# Patient Record
Sex: FEMALE | Race: WHITE | Employment: FULL TIME | URBAN - METROPOLITAN AREA
[De-identification: names, ages, dates, MRNs, and addresses within clinical notes are randomized per-mention and may not be internally consistent; named-entity substitution may affect disease eponyms.]

---

## 2018-02-09 LAB
HBV SURFACE AG SERPL QL IA: NEGATIVE
HIV 1+2 AB+HIV1 P24 AG SERPL QL IA: NORMAL
RUBELLA ABY IGG: NORMAL

## 2018-06-19 ENCOUNTER — HOSPITAL ENCOUNTER (OUTPATIENT)
Facility: CLINIC | Age: 33
End: 2018-06-19

## 2018-06-27 RX ORDER — CITRIC ACID/SODIUM CITRATE 334-500MG
30 SOLUTION, ORAL ORAL
Status: CANCELLED | OUTPATIENT
Start: 2018-06-27

## 2018-06-27 RX ORDER — CEFAZOLIN SODIUM 1 G/3ML
1 INJECTION, POWDER, FOR SOLUTION INTRAMUSCULAR; INTRAVENOUS SEE ADMIN INSTRUCTIONS
Status: CANCELLED | OUTPATIENT
Start: 2018-06-27

## 2018-06-27 RX ORDER — CEFAZOLIN SODIUM 2 G/100ML
2 INJECTION, SOLUTION INTRAVENOUS
Status: CANCELLED | OUTPATIENT
Start: 2018-06-27

## 2018-06-27 RX ORDER — SODIUM CHLORIDE, SODIUM LACTATE, POTASSIUM CHLORIDE, CALCIUM CHLORIDE 600; 310; 30; 20 MG/100ML; MG/100ML; MG/100ML; MG/100ML
INJECTION, SOLUTION INTRAVENOUS CONTINUOUS
Status: CANCELLED | OUTPATIENT
Start: 2018-06-27

## 2018-06-28 LAB — GROUP B STREP PCR: NEGATIVE

## 2018-07-02 ENCOUNTER — ANESTHESIA EVENT (OUTPATIENT)
Dept: OBGYN | Facility: CLINIC | Age: 33
End: 2018-07-02
Payer: COMMERCIAL

## 2018-07-02 ENCOUNTER — HOSPITAL ENCOUNTER (INPATIENT)
Facility: CLINIC | Age: 33
LOS: 3 days | Discharge: HOME HEALTH | End: 2018-07-05
Attending: OBSTETRICS & GYNECOLOGY | Admitting: OBSTETRICS & GYNECOLOGY
Payer: COMMERCIAL

## 2018-07-02 ENCOUNTER — ANESTHESIA (OUTPATIENT)
Dept: OBGYN | Facility: CLINIC | Age: 33
End: 2018-07-02
Payer: COMMERCIAL

## 2018-07-02 LAB
ABO + RH BLD: NORMAL
ABO + RH BLD: NORMAL
BLD GP AB SCN SERPL QL: NORMAL
BLOOD BANK CMNT PATIENT-IMP: NORMAL
HGB BLD-MCNC: 11.5 G/DL (ref 11.7–15.7)
SPECIMEN EXP DATE BLD: NORMAL
T PALLIDUM AB SER QL: NONREACTIVE

## 2018-07-02 PROCEDURE — 85018 HEMOGLOBIN: CPT | Performed by: OBSTETRICS & GYNECOLOGY

## 2018-07-02 PROCEDURE — 86850 RBC ANTIBODY SCREEN: CPT | Performed by: OBSTETRICS & GYNECOLOGY

## 2018-07-02 PROCEDURE — 36000056 ZZH SURGERY LEVEL 3 1ST 30 MIN: Performed by: OBSTETRICS & GYNECOLOGY

## 2018-07-02 PROCEDURE — 25000128 H RX IP 250 OP 636: Performed by: OBSTETRICS & GYNECOLOGY

## 2018-07-02 PROCEDURE — 37000009 ZZH ANESTHESIA TECHNICAL FEE, EACH ADDTL 15 MIN: Performed by: OBSTETRICS & GYNECOLOGY

## 2018-07-02 PROCEDURE — 25000125 ZZHC RX 250: Performed by: OBSTETRICS & GYNECOLOGY

## 2018-07-02 PROCEDURE — 25000128 H RX IP 250 OP 636: Performed by: ANESTHESIOLOGY

## 2018-07-02 PROCEDURE — 27210794 ZZH OR GENERAL SUPPLY STERILE: Performed by: OBSTETRICS & GYNECOLOGY

## 2018-07-02 PROCEDURE — 36000058 ZZH SURGERY LEVEL 3 EA 15 ADDTL MIN: Performed by: OBSTETRICS & GYNECOLOGY

## 2018-07-02 PROCEDURE — G0463 HOSPITAL OUTPT CLINIC VISIT: HCPCS

## 2018-07-02 PROCEDURE — 86901 BLOOD TYPING SEROLOGIC RH(D): CPT | Performed by: OBSTETRICS & GYNECOLOGY

## 2018-07-02 PROCEDURE — 71000012 ZZH RECOVERY PHASE 1 LEVEL 1 FIRST HR: Performed by: OBSTETRICS & GYNECOLOGY

## 2018-07-02 PROCEDURE — 86900 BLOOD TYPING SEROLOGIC ABO: CPT | Performed by: OBSTETRICS & GYNECOLOGY

## 2018-07-02 PROCEDURE — 37000008 ZZH ANESTHESIA TECHNICAL FEE, 1ST 30 MIN: Performed by: OBSTETRICS & GYNECOLOGY

## 2018-07-02 PROCEDURE — 25000132 ZZH RX MED GY IP 250 OP 250 PS 637: Performed by: OBSTETRICS & GYNECOLOGY

## 2018-07-02 PROCEDURE — 12000029 ZZH R&B OB INTERMEDIATE

## 2018-07-02 PROCEDURE — 86780 TREPONEMA PALLIDUM: CPT | Performed by: OBSTETRICS & GYNECOLOGY

## 2018-07-02 PROCEDURE — 27210995 ZZH RX 272: Performed by: OBSTETRICS & GYNECOLOGY

## 2018-07-02 RX ORDER — ONDANSETRON 2 MG/ML
4 INJECTION INTRAMUSCULAR; INTRAVENOUS EVERY 6 HOURS PRN
Status: DISCONTINUED | OUTPATIENT
Start: 2018-07-02 | End: 2018-07-05 | Stop reason: HOSPADM

## 2018-07-02 RX ORDER — METOCLOPRAMIDE HYDROCHLORIDE 5 MG/ML
10 INJECTION INTRAMUSCULAR; INTRAVENOUS EVERY 6 HOURS PRN
Status: DISCONTINUED | OUTPATIENT
Start: 2018-07-02 | End: 2018-07-05 | Stop reason: HOSPADM

## 2018-07-02 RX ORDER — NALOXONE HYDROCHLORIDE 0.4 MG/ML
.1-.4 INJECTION, SOLUTION INTRAMUSCULAR; INTRAVENOUS; SUBCUTANEOUS
Status: DISCONTINUED | OUTPATIENT
Start: 2018-07-02 | End: 2018-07-05 | Stop reason: HOSPADM

## 2018-07-02 RX ORDER — LIDOCAINE 40 MG/G
CREAM TOPICAL
Status: DISCONTINUED | OUTPATIENT
Start: 2018-07-02 | End: 2018-07-05 | Stop reason: HOSPADM

## 2018-07-02 RX ORDER — SODIUM CHLORIDE, SODIUM LACTATE, POTASSIUM CHLORIDE, CALCIUM CHLORIDE 600; 310; 30; 20 MG/100ML; MG/100ML; MG/100ML; MG/100ML
INJECTION, SOLUTION INTRAVENOUS CONTINUOUS
Status: DISCONTINUED | OUTPATIENT
Start: 2018-07-02 | End: 2018-07-02 | Stop reason: HOSPADM

## 2018-07-02 RX ORDER — IBUPROFEN 800 MG/1
800 TABLET, FILM COATED ORAL EVERY 6 HOURS PRN
Status: DISCONTINUED | OUTPATIENT
Start: 2018-07-02 | End: 2018-07-05 | Stop reason: HOSPADM

## 2018-07-02 RX ORDER — KETOROLAC TROMETHAMINE 30 MG/ML
30 INJECTION, SOLUTION INTRAMUSCULAR; INTRAVENOUS EVERY 6 HOURS
Status: COMPLETED | OUTPATIENT
Start: 2018-07-02 | End: 2018-07-03

## 2018-07-02 RX ORDER — SIMETHICONE 80 MG
80 TABLET,CHEWABLE ORAL 4 TIMES DAILY PRN
Status: DISCONTINUED | OUTPATIENT
Start: 2018-07-02 | End: 2018-07-05 | Stop reason: HOSPADM

## 2018-07-02 RX ORDER — ACETAMINOPHEN 325 MG/1
975 TABLET ORAL EVERY 8 HOURS
Status: DISPENSED | OUTPATIENT
Start: 2018-07-02 | End: 2018-07-05

## 2018-07-02 RX ORDER — FENTANYL CITRATE 50 UG/ML
25-50 INJECTION, SOLUTION INTRAMUSCULAR; INTRAVENOUS
Status: DISCONTINUED | OUTPATIENT
Start: 2018-07-02 | End: 2018-07-02 | Stop reason: HOSPADM

## 2018-07-02 RX ORDER — NALBUPHINE HYDROCHLORIDE 10 MG/ML
2.5-5 INJECTION, SOLUTION INTRAMUSCULAR; INTRAVENOUS; SUBCUTANEOUS EVERY 6 HOURS PRN
Status: DISCONTINUED | OUTPATIENT
Start: 2018-07-02 | End: 2018-07-02 | Stop reason: HOSPADM

## 2018-07-02 RX ORDER — AMOXICILLIN 250 MG
1 CAPSULE ORAL 2 TIMES DAILY PRN
Status: DISCONTINUED | OUTPATIENT
Start: 2018-07-02 | End: 2018-07-05 | Stop reason: HOSPADM

## 2018-07-02 RX ORDER — ONDANSETRON 2 MG/ML
4 INJECTION INTRAMUSCULAR; INTRAVENOUS EVERY 30 MIN PRN
Status: DISCONTINUED | OUTPATIENT
Start: 2018-07-02 | End: 2018-07-02 | Stop reason: HOSPADM

## 2018-07-02 RX ORDER — NALOXONE HYDROCHLORIDE 0.4 MG/ML
.1-.4 INJECTION, SOLUTION INTRAMUSCULAR; INTRAVENOUS; SUBCUTANEOUS
Status: DISCONTINUED | OUTPATIENT
Start: 2018-07-02 | End: 2018-07-02 | Stop reason: HOSPADM

## 2018-07-02 RX ORDER — EPHEDRINE SULFATE 50 MG/ML
5 INJECTION, SOLUTION INTRAMUSCULAR; INTRAVENOUS; SUBCUTANEOUS
Status: DISCONTINUED | OUTPATIENT
Start: 2018-07-02 | End: 2018-07-02 | Stop reason: HOSPADM

## 2018-07-02 RX ORDER — MISOPROSTOL 200 UG/1
800 TABLET ORAL
Status: DISCONTINUED | OUTPATIENT
Start: 2018-07-02 | End: 2018-07-05 | Stop reason: HOSPADM

## 2018-07-02 RX ORDER — OXYTOCIN 10 [USP'U]/ML
10 INJECTION, SOLUTION INTRAMUSCULAR; INTRAVENOUS
Status: DISCONTINUED | OUTPATIENT
Start: 2018-07-02 | End: 2018-07-05 | Stop reason: HOSPADM

## 2018-07-02 RX ORDER — METHYLERGONOVINE MALEATE 0.2 MG/ML
200 INJECTION INTRAVENOUS
Status: DISCONTINUED | OUTPATIENT
Start: 2018-07-02 | End: 2018-07-05 | Stop reason: HOSPADM

## 2018-07-02 RX ORDER — BISACODYL 10 MG
10 SUPPOSITORY, RECTAL RECTAL DAILY PRN
Status: DISCONTINUED | OUTPATIENT
Start: 2018-07-04 | End: 2018-07-05 | Stop reason: HOSPADM

## 2018-07-02 RX ORDER — HYDROCORTISONE 2.5 %
CREAM (GRAM) TOPICAL 3 TIMES DAILY PRN
Status: DISCONTINUED | OUTPATIENT
Start: 2018-07-02 | End: 2018-07-05 | Stop reason: HOSPADM

## 2018-07-02 RX ORDER — AMOXICILLIN 250 MG
2 CAPSULE ORAL 2 TIMES DAILY PRN
Status: DISCONTINUED | OUTPATIENT
Start: 2018-07-02 | End: 2018-07-05 | Stop reason: HOSPADM

## 2018-07-02 RX ORDER — ONDANSETRON 2 MG/ML
INJECTION INTRAMUSCULAR; INTRAVENOUS PRN
Status: DISCONTINUED | OUTPATIENT
Start: 2018-07-02 | End: 2018-07-02

## 2018-07-02 RX ORDER — HYDROMORPHONE HYDROCHLORIDE 1 MG/ML
.3-.5 INJECTION, SOLUTION INTRAMUSCULAR; INTRAVENOUS; SUBCUTANEOUS EVERY 10 MIN PRN
Status: DISCONTINUED | OUTPATIENT
Start: 2018-07-02 | End: 2018-07-02 | Stop reason: HOSPADM

## 2018-07-02 RX ORDER — MEPERIDINE HYDROCHLORIDE 50 MG/ML
12.5 INJECTION INTRAMUSCULAR; INTRAVENOUS; SUBCUTANEOUS
Status: DISCONTINUED | OUTPATIENT
Start: 2018-07-02 | End: 2018-07-02 | Stop reason: HOSPADM

## 2018-07-02 RX ORDER — LANOLIN 100 %
OINTMENT (GRAM) TOPICAL
Status: DISCONTINUED | OUTPATIENT
Start: 2018-07-02 | End: 2018-07-05 | Stop reason: HOSPADM

## 2018-07-02 RX ORDER — LIDOCAINE 40 MG/G
CREAM TOPICAL
Status: DISCONTINUED | OUTPATIENT
Start: 2018-07-02 | End: 2018-07-02 | Stop reason: HOSPADM

## 2018-07-02 RX ORDER — MAGNESIUM HYDROXIDE 1200 MG/15ML
LIQUID ORAL PRN
Status: DISCONTINUED | OUTPATIENT
Start: 2018-07-02 | End: 2018-07-05 | Stop reason: HOSPADM

## 2018-07-02 RX ORDER — OXYTOCIN/0.9 % SODIUM CHLORIDE 30/500 ML
340 PLASTIC BAG, INJECTION (ML) INTRAVENOUS CONTINUOUS PRN
Status: DISCONTINUED | OUTPATIENT
Start: 2018-07-02 | End: 2018-07-05 | Stop reason: HOSPADM

## 2018-07-02 RX ORDER — OXYTOCIN/0.9 % SODIUM CHLORIDE 30/500 ML
PLASTIC BAG, INJECTION (ML) INTRAVENOUS PRN
Status: DISCONTINUED | OUTPATIENT
Start: 2018-07-02 | End: 2018-07-02

## 2018-07-02 RX ORDER — DIPHENHYDRAMINE HCL 25 MG
25 CAPSULE ORAL EVERY 6 HOURS PRN
Status: DISCONTINUED | OUTPATIENT
Start: 2018-07-02 | End: 2018-07-05 | Stop reason: HOSPADM

## 2018-07-02 RX ORDER — DEXTROSE, SODIUM CHLORIDE, SODIUM LACTATE, POTASSIUM CHLORIDE, AND CALCIUM CHLORIDE 5; .6; .31; .03; .02 G/100ML; G/100ML; G/100ML; G/100ML; G/100ML
INJECTION, SOLUTION INTRAVENOUS CONTINUOUS
Status: DISCONTINUED | OUTPATIENT
Start: 2018-07-02 | End: 2018-07-05 | Stop reason: HOSPADM

## 2018-07-02 RX ORDER — ALBUTEROL SULFATE 0.83 MG/ML
2.5 SOLUTION RESPIRATORY (INHALATION) EVERY 4 HOURS PRN
Status: DISCONTINUED | OUTPATIENT
Start: 2018-07-02 | End: 2018-07-02 | Stop reason: HOSPADM

## 2018-07-02 RX ORDER — IBUPROFEN 600 MG/1
600 TABLET, FILM COATED ORAL EVERY 6 HOURS PRN
Status: DISCONTINUED | OUTPATIENT
Start: 2018-07-02 | End: 2018-07-05 | Stop reason: HOSPADM

## 2018-07-02 RX ORDER — IBUPROFEN 400 MG/1
400 TABLET, FILM COATED ORAL EVERY 6 HOURS PRN
Status: DISCONTINUED | OUTPATIENT
Start: 2018-07-02 | End: 2018-07-05 | Stop reason: HOSPADM

## 2018-07-02 RX ORDER — CEFAZOLIN SODIUM 1 G/3ML
1 INJECTION, POWDER, FOR SOLUTION INTRAMUSCULAR; INTRAVENOUS SEE ADMIN INSTRUCTIONS
Status: DISCONTINUED | OUTPATIENT
Start: 2018-07-02 | End: 2018-07-02 | Stop reason: HOSPADM

## 2018-07-02 RX ORDER — DIPHENHYDRAMINE HYDROCHLORIDE 50 MG/ML
25 INJECTION INTRAMUSCULAR; INTRAVENOUS EVERY 6 HOURS PRN
Status: DISCONTINUED | OUTPATIENT
Start: 2018-07-02 | End: 2018-07-05 | Stop reason: HOSPADM

## 2018-07-02 RX ORDER — EPHEDRINE SULFATE 50 MG/ML
INJECTION, SOLUTION INTRAVENOUS PRN
Status: DISCONTINUED | OUTPATIENT
Start: 2018-07-02 | End: 2018-07-02

## 2018-07-02 RX ORDER — HYDROMORPHONE HYDROCHLORIDE 1 MG/ML
.3-.5 INJECTION, SOLUTION INTRAMUSCULAR; INTRAVENOUS; SUBCUTANEOUS EVERY 30 MIN PRN
Status: DISCONTINUED | OUTPATIENT
Start: 2018-07-02 | End: 2018-07-05 | Stop reason: HOSPADM

## 2018-07-02 RX ORDER — CITRIC ACID/SODIUM CITRATE 334-500MG
30 SOLUTION, ORAL ORAL
Status: COMPLETED | OUTPATIENT
Start: 2018-07-02 | End: 2018-07-02

## 2018-07-02 RX ORDER — SCOLOPAMINE TRANSDERMAL SYSTEM 1 MG/1
1 PATCH, EXTENDED RELEASE TRANSDERMAL ONCE
Status: DISCONTINUED | OUTPATIENT
Start: 2018-07-02 | End: 2018-07-02 | Stop reason: HOSPADM

## 2018-07-02 RX ORDER — CEFAZOLIN SODIUM 2 G/100ML
2 INJECTION, SOLUTION INTRAVENOUS
Status: COMPLETED | OUTPATIENT
Start: 2018-07-02 | End: 2018-07-02

## 2018-07-02 RX ORDER — OXYTOCIN/0.9 % SODIUM CHLORIDE 30/500 ML
100 PLASTIC BAG, INJECTION (ML) INTRAVENOUS CONTINUOUS
Status: DISCONTINUED | OUTPATIENT
Start: 2018-07-02 | End: 2018-07-05 | Stop reason: HOSPADM

## 2018-07-02 RX ORDER — ONDANSETRON 4 MG/1
4 TABLET, ORALLY DISINTEGRATING ORAL EVERY 30 MIN PRN
Status: DISCONTINUED | OUTPATIENT
Start: 2018-07-02 | End: 2018-07-02 | Stop reason: HOSPADM

## 2018-07-02 RX ORDER — OXYCODONE HYDROCHLORIDE 5 MG/1
5-10 TABLET ORAL
Status: DISCONTINUED | OUTPATIENT
Start: 2018-07-02 | End: 2018-07-05 | Stop reason: HOSPADM

## 2018-07-02 RX ORDER — ACETAMINOPHEN 325 MG/1
650 TABLET ORAL EVERY 4 HOURS PRN
Status: DISCONTINUED | OUTPATIENT
Start: 2018-07-05 | End: 2018-07-05 | Stop reason: HOSPADM

## 2018-07-02 RX ADMIN — SODIUM CHLORIDE, SODIUM LACTATE, POTASSIUM CHLORIDE, CALCIUM CHLORIDE AND DEXTROSE MONOHYDRATE: 5; 600; 310; 30; 20 INJECTION, SOLUTION INTRAVENOUS at 09:25

## 2018-07-02 RX ADMIN — CEFAZOLIN SODIUM 2 G: 2 INJECTION, SOLUTION INTRAVENOUS at 01:31

## 2018-07-02 RX ADMIN — Medication 1 ML: at 01:58

## 2018-07-02 RX ADMIN — SODIUM CHLORIDE, POTASSIUM CHLORIDE, SODIUM LACTATE AND CALCIUM CHLORIDE: 600; 310; 30; 20 INJECTION, SOLUTION INTRAVENOUS at 01:51

## 2018-07-02 RX ADMIN — OXYTOCIN-SODIUM CHLORIDE 0.9% IV SOLN 30 UNIT/500ML 100 ML/HR: 30-0.9/5 SOLUTION at 03:55

## 2018-07-02 RX ADMIN — SODIUM CHLORIDE, POTASSIUM CHLORIDE, SODIUM LACTATE AND CALCIUM CHLORIDE: 600; 310; 30; 20 INJECTION, SOLUTION INTRAVENOUS at 01:31

## 2018-07-02 RX ADMIN — SODIUM CITRATE AND CITRIC ACID MONOHYDRATE 30 ML: 500; 334 SOLUTION ORAL at 01:22

## 2018-07-02 RX ADMIN — ONDANSETRON 4 MG: 2 SOLUTION INTRAMUSCULAR; INTRAVENOUS at 02:40

## 2018-07-02 RX ADMIN — EPHEDRINE SULFATE 5 MG: 50 INJECTION, SOLUTION INTRAVENOUS at 01:41

## 2018-07-02 RX ADMIN — KETOROLAC TROMETHAMINE 30 MG: 30 INJECTION, SOLUTION INTRAMUSCULAR at 12:22

## 2018-07-02 RX ADMIN — SODIUM CHLORIDE, POTASSIUM CHLORIDE, SODIUM LACTATE AND CALCIUM CHLORIDE 500 ML: 600; 310; 30; 20 INJECTION, SOLUTION INTRAVENOUS at 13:00

## 2018-07-02 RX ADMIN — Medication 199 ML: at 02:16

## 2018-07-02 RX ADMIN — SODIUM CHLORIDE, POTASSIUM CHLORIDE, SODIUM LACTATE AND CALCIUM CHLORIDE 1000 ML: 600; 310; 30; 20 INJECTION, SOLUTION INTRAVENOUS at 01:10

## 2018-07-02 RX ADMIN — ONDANSETRON 4 MG: 2 INJECTION INTRAMUSCULAR; INTRAVENOUS at 02:01

## 2018-07-02 RX ADMIN — KETOROLAC TROMETHAMINE 30 MG: 30 INJECTION, SOLUTION INTRAMUSCULAR at 18:34

## 2018-07-02 RX ADMIN — SODIUM CHLORIDE, SODIUM LACTATE, POTASSIUM CHLORIDE, CALCIUM CHLORIDE AND DEXTROSE MONOHYDRATE: 5; 600; 310; 30; 20 INJECTION, SOLUTION INTRAVENOUS at 11:59

## 2018-07-02 RX ADMIN — KETOROLAC TROMETHAMINE 30 MG: 30 INJECTION, SOLUTION INTRAMUSCULAR at 03:55

## 2018-07-02 RX ADMIN — SENNOSIDES AND DOCUSATE SODIUM 1 TABLET: 8.6; 5 TABLET ORAL at 21:35

## 2018-07-02 RX ADMIN — ACETAMINOPHEN 975 MG: 325 TABLET, FILM COATED ORAL at 12:22

## 2018-07-02 RX ADMIN — METOCLOPRAMIDE 10 MG: 5 INJECTION, SOLUTION INTRAMUSCULAR; INTRAVENOUS at 08:43

## 2018-07-02 NOTE — ANESTHESIA PREPROCEDURE EVALUATION
PAC NOTE:       ANESTHESIA PRE EVALUATION:  Anesthesia Evaluation     .             ROS/MED HX    ENT/Pulmonary:  - neg pulmonary ROS     Neurologic:  - neg neurologic ROS     Cardiovascular:  - neg cardiovascular ROS       METS/Exercise Tolerance:     Hematologic:  - neg hematologic  ROS       Musculoskeletal:  - neg musculoskeletal ROS       GI/Hepatic:  - neg GI/hepatic ROS       Renal/Genitourinary:  - ROS Renal section negative       Endo:     (+) type II DM .      Psychiatric:  - neg psychiatric ROS       Infectious Disease:  - neg infectious disease ROS       Malignancy:         Other: Comment: .Lab Test        09/10/15     04/19/10                       2027          0924          WBC          6.1          6.6           HGB          12.4         13.9          MCV          90           92            PLT          206          205            Lab Test        09/10/15                       2027          NA           142           POTASSIUM    3.8           CHLORIDE     109           CO2          26            BUN          11            CR           0.70          ANIONGAP     7             KRISTINA          8.2*          GLC          84                                Physical Exam  Normal systems: cardiovascular and pulmonary    Airway   Mallampati: II    Dental     Cardiovascular   Rhythm and rate: regular and normal      Pulmonary    breath sounds clear to auscultation             Anesthesia Plan      History & Physical Review  History and physical reviewed and following examination; no interval change.    ASA Status:  2 emergent.        Plan for Spinal   PONV prophylaxis:  Ondansetron (or other 5HT-3) and Scopolamine patch  Breech  Marques       Postoperative Care  Postoperative pain management:  IV analgesics, Oral pain medications and Multi-modal analgesia.      Consents                            .

## 2018-07-02 NOTE — LACTATION NOTE
This note was copied from a baby's chart.  LC to see patient.  Her baby has been latching well to both sides.  Rachael was sleepy at the time of the visit.  Her baby is 37 1/7 weeks gestation.  FOB was holding infant while Rachael was resting.  Large volumes of colostrum are present and easily expressed.  LC recommended nursing her baby frequently due to early gestation and refusal of blood sugar checks.  Baby was asymptomatic while LC present and had just finished nursing.  They are aware she may call prn if assistance is required.  LC will also follow up tomorrow.

## 2018-07-02 NOTE — IP AVS SNAPSHOT
[facility]    [address] [facility] [address]    Phone:  [phone]    Fax:  [phone]                                       After Visit Summary   7/2/2018    Rachael Peña    MRN: [id number]           After Visit Summary Signature Page     I have received my discharge instructions, and my questions have been answered. I have discussed any challenges I see with this plan with the nurse or doctor.    ..........................................................................................................................................  Patient/Patient Representative Signature      ..........................................................................................................................................  Patient Representative Print Name and Relationship to Patient    ..................................................               ................................................  Date                                            Time    ..........................................................................................................................................  Reviewed by Signature/Title    ...................................................              ..............................................  Date                                                            Time

## 2018-07-02 NOTE — PLAN OF CARE
Data: Rachael Peña transferred to 428 via cart at 0430. Baby transferred via parent's arms.  Action: Receiving unit notified of transfer: Yes. Patient and family notified of room change. Report given to Ashlyn VARGAS at 0415. Belongings sent to receiving unit. Accompanied by Registered Nurse. Oriented patient to surroundings. Call light within reach. ID bands double-checked with receiving RN.  Response: Patient tolerated transfer and is stable.

## 2018-07-02 NOTE — PLAN OF CARE
Data: Patient presented to [facility]: 2018 12:14 AM.  Reason for maternal/fetal assessment is leaking vaginal fluid. Patient reports clear gush of fluid at approximately 2350 on 18.  Patient is a .  Prenatal record reviewed. Pregnancy has been uncomplicated..  Gestational Age 37w1d. VSS. Fetal movement present. Patient denies uterine contractions, vaginal bleeding, abdominal pain, pelvic pressure, nausea, vomiting, headache, visual disturbances, epigastric or URQ pain, significant edema. Support person is present.   Action: Verbal consent for EFM. Triage assessment completed. Bill of rights reviewed.  Response: Patient verbalized agreement with plan. Will contact Dr Leeann Oleary with update and further orders.

## 2018-07-02 NOTE — PROVIDER NOTIFICATION
Dr. Peña updated throughout shift of low UOP. D5LR ordered to be ran at a faster rate.  After paging again, saying 2 hrs later there was still low UOP. LR bolus ordered and given. Updated Dr. Peña at 1500. Wants next nurse to update her with I/O at 1700. Will continue to monitor pt. When reported pt has not been up at bedside due to nausea, bleeding and I/O, Dr. Peña said that is good and to try later tonight.

## 2018-07-02 NOTE — IP AVS SNAPSHOT
MRN:[id number]                      After Visit Summary   2018    Rachael Peña    MRN: [id number]           Thank you!     Thank you for choosing [facility] for your care. Our goal is always to provide you with excellent care. Hearing back from our patients is one way we can continue to improve our services. Please take a few minutes to complete the written survey that you may receive in the mail after you visit. If you would like to speak to someone directly about your visit please contact Patient Relations at [phone]. Thank you!          Patient Information     Date Of Birth          1985        About your hospital stay     You were admitted on:  2018 You last received care in the:  [facility] Postpartum    You were discharged on:  2018        Reason for your hospital stay       Maternity care                  Who to Call     For medical emergencies, please call 911.  For non-urgent questions about your medical care, please call your primary care provider or clinic, [phone]  For questions related to your surgery, please call your surgery clinic        Attending Provider     Provider Leeann Pitts MD OB/Gyn       Primary Care Provider Office Phone # Fax #    Wade De Lunaaditya Ramirez [phone]      After Care Instructions     Activity       Review discharge instructions            Diet       Resume previous diet            Discharge Instructions - Postpartum visit       Schedule postpartum visit with your provider and return to clinic in 6 weeks.                  Further instructions from your care team       Postop  Birth Instructions   Schedule  Clinic appointment in 6 weeks .   [facility] .      Lactation     Activity       Do not lift more than 10 pounds for 6 weeks after surgery.  Ask family and friends for help when you need it.    No driving  until you have stopped taking your pain medications (usually two weeks after surgery).    No heavy exercise or activity for 6 weeks.  Don't do anything that will put a strain on your surgery site.    Don't strain when using the toilet.  Your care team may prescribe a stool softener if you have problems with your bowel movements.     To care for your incision:       Keep the incision clean and dry.    Do not soak your incision in water. No swimming or hot tubs until it has fully healed. You may soak in the bathtub if the water level is below your incision.    Do not use peroxide, gel, cream, lotion, or ointment on your incision.    Adjust your clothes to avoid pressure on your surgery site (check the elastic in your underwear for example).     You may see a small amount of clear or pink drainage and this is normal.  Check with your health care provider:       If the drainage increases or has an odor.    If the incision reddens, you have swelling, or develop a rash.    If you have increased pain and the medicine we prescribed doesn't help.    If you have a fever above 100.4 F (38 C) with or without chills when placing thermometer under your tongue.   The area around your incision (surgery wound), will feel numb.  This is normal. The numbness should go away in less than a year.     Keep your hands clean:  Always wash your hands before touching your incision (surgery wound). This helps reduce your risk of infection. If your hands aren't dirty, you may use an alcohol hand-rub to clean your hands. Keep your nails clean and short.    Call your healthcare provider if you have any of these symptoms:       You soak a sanitary pad with blood within 1 hour, or you see blood clots larger than a golf ball.    Bleeding that lasts more than 6 weeks.    Vaginal discharge that smells bad.    Severe pain, cramping or tenderness in your lower belly area.    A need to urinate more frequently (use the toilet more often), more urgently  "(use the toilet very quickly), or it burns when you urinate.    Nausea and vomiting.    Redness, swelling or pain around a vein in your leg.    Problems breastfeeding or a red or painful area on your breast.    Chest pain and cough or are gasping for air.    Problems with coping with sadness, anxiety or depression. If you have concerns about hurting yourself or the baby, call your provider immediately.      You have questions or concerns after you return home.                  Pending Results     No orders found from 6/30/2018 to 7/3/2018.            Statement of Approval     Ordered          07/05/18 0800  I have reviewed and agree with all the recommendations and orders detailed in this document.  EFFECTIVE NOW     Approved and electronically signed by:  Artur Hu MD           07/04/18 0817  I have reviewed and agree with all the recommendations and orders detailed in this document.  EFFECTIVE NOW     Approved and electronically signed by:  Brisa Peña MD             Admission Information     Date & Time Provider Department Dept. Phone    7/2/2018 Leeann Oleary MD [facility] [phone]      Your Vitals Were     Blood Pressure Pulse Temperature Respirations Height Weight    122/79 70 97.9  F (36.6  C) (Oral) 18 1.549 m (5' 1\") 88.5 kg (195 lb)    Pulse Oximetry BMI (Body Mass Index)                99% 36.84 kg/m2          MyChart Information     [employer] lets you send messages to your doctor, view your test results, renew your prescriptions, schedule appointments and more. To sign up, go to [email or web address] . Click on \"Log in\" on the left side of the screen, which will take you to the Welcome page. Then click on \"Sign up Now\" on the right side of the page.     You will be asked to enter the access code listed below, as well as some personal information. Please follow the directions to create your username and password.     Your access code is: " [email or web address]  Expires: 10/3/2018  9:27 AM     Your access code will  in 90 days. If you need help or a new code, please call your [place] clinic or [phone].        Care EveryWhere ID     This is your Care EveryWhere ID. This could be used by other organizations to access your [facility] medical records  [id number]        Equal Access to Services     [facility]W : Hadii aad ku hadasho Soomaali, waaxda luqadaha, qaybta kaalmada adeegyada, waxay idiin hayaan adechidi cleaning laMarbinkirstin . So [facility] [phone].    ATENCIÓN: Si habla español, tiene a lagos disposición servicios gratuitos de asistencia lingüística. Donnie al [phone].    We comply with applicable federal civil rights laws and [place] laws. We do not discriminate on the basis of race, color, national origin, age, disability, sex, sexual orientation, or gender identity.               Review of your medicines      START taking        Dose / Directions    senna-docusate 8.6-50 MG per tablet   Commonly known as:  SENOKOT-S;PERICOLACE        Dose:  1 tablet   Take 1 tablet by mouth 2 times daily as needed for constipation   Quantity:  40 tablet   Refills:  0            Where to get your medicines      These medications were sent to [id number] - [address] [address]    Hours:  9Am-9Pm (M-F) 9Am-6Pm (S&S) Phone:  [phone]     senna-docusate 8.6-50 MG per tablet                Protect others around you: Learn how to safely use, store and throw away your medicines at www.disposemymeds.org.             Medication List: This is a list of all your medications and when to take them. Check marks below indicate your daily home schedule. Keep this list as a reference.      Medications           Morning Afternoon Evening Bedtime As Needed    senna-docusate 8.6-50 MG per tablet   Commonly known as:  SENOKOT-S;PERICOLACE   Take 1 tablet by mouth 2 times daily as needed for constipation   Last time this  was given:  1 tablet on 7/5/2018  9:05 AM

## 2018-07-02 NOTE — PROGRESS NOTES
[facility]   Obstetrics Post-Op / Progress Note         Interval History:   Pain controlled but having nausea/vomiting, received zofran             Physical Exam:   All vitals stable  Temp: 97.9  F (36.6  C) Temp src: Oral BP: 119/59 Pulse: 62   Resp: 16 SpO2: 95 %       Urine 200 cc/5 hrs    EXAM:  Constitutional: healthy, alert, no distress.   Abdomen: Abdomen soft, non-tender. BS normal. No masses, fundus is firm.  Incision: Clean, dry and intact, no erythema or induration.            Data:   All laboratory data related to this surgery reviewed  Lab Results   Component Value Date    HGB 11.5 (L) 2018            Assessment and Plan:    Assessment:   Post-operative day #0  Low transverse primary  section         Postop nausea      Plan:   Add Reglan  Increase IV fluids  Monitor urine output         Brisa Peña

## 2018-07-02 NOTE — OP NOTE
Procedure Date: 2018      PREOPERATIVE DIAGNOSES:   1.  A 32-year-old G1-P0 at 37 and 1 weeks with an intrauterine pregnancy.   2.  Spontaneous rupture of membranes.   3.  Known breech fetal presentation.      POSTOPERATIVE DIAGNOSES:   1.  A 32-year-old G1-P0 at 37 and 1 weeks with an intrauterine pregnancy.   2.  Normal pelvis.      PROCEDURE:  Primary low transverse caesarean section.      SURGEON:  Leeann Oleary MD      ANESTHESIA:  Spinal.      INDICATIONS:  This is a 32-year-old G1-P0 who presented to Labor and Delivery at 37 and 1 week's gestation with spontaneous rupture of membranes.  Pregnancy was complicated by known breech fetal presentation with head on maternal left side.  This was reconfirmed prior to the procedure.  Given the ruptured membranes and early labor with breech presentation, the decision was made to proceed with primary  section.      DESCRIPTION OF PROCEDURE:  The patient was taken to the operating room where her spinal anesthesia was found to be adequate.  She was prepped and draped in the normal sterile fashion in the dorsal supine position with a leftward tilt.  She was given 2 grams of Ancef prior to the procedure.  A Pfannenstiel skin incision was made with a scalpel and carried through to the underlying layer of fascia.  The fascia was scored in the midline and extended laterally with Rodriguez scissors.  The superior aspect of the fascial incision was grasped with Kocher clamps, elevated, and the rectus muscles dissected off both sharply and bluntly.  The same procedure was undertaken on the inferior aspect of the incision.  The rectus muscles were then gently  and the peritoneal cavity was then identified and entered bluntly.  This incision was extended bluntly.  A bladder blade was placed.  The vesicouterine peritoneum was identified and grasped with Russians.  This was entered sharply with Metzenbaum scissors.  The bladder flap was then created both  sharply and bluntly.  The bladder blade was replaced.  The hysterotomy was performed in a low transverse fashion with a scalpel.  This was extended bluntly.  All metal was removed and the infant was delivered in charity breech presentation without complication.  Delayed cord clamping was performed.  The uterus was vigorously massaged and the placenta was delivered intact.  The uterus was then exteriorized and cleared of all clots and debris.  The hysterotomy was repaired with 0 Vicryl in a running locked fashion.  A second layer of horizontal imbrication was then performed.  Good hemostasis was noted.  The uterus was then returned to the abdomen.  The abdomen was irrigated and dried, and continued hemostasis was noted.  The fascia was closed with 0 Vicryl in a running fashion.  The subcutaneous tissue was closed with 3-0 Vicryl in a running fashion.  The skin was closed with absorbable staples.  Sponge, lap and needle counts were correct x 2.      FINDINGS:  Liveborn infant female, weight of 7 pounds 9 ounces.  Apgars of 8 and 9.  Normal uterus, tubes and ovaries.      ESTIMATED BLOOD LOSS:  600 mL.         TOBI FITZPATRICK MD             D: 2018   T: 2018   MT: KATARINA      Name:     RAJIV LOCKWOOD   MRN:      [id number]        Account:        [id number]   :      1985           Procedure Date: 2018      Document: [id number]

## 2018-07-02 NOTE — H&P
History and Physical     Rachael Peña MRN# [id number]   YOB: 1985 Age: 32 year old      Date of Admission:  2018    Primary care provider: Balgobin, Christopher Lennox Paul          Assessment and Plan:   33yo  at 37.1 with SROM, breech fetal presentation.  --Patient consented for primary  section.            Attestation:  This patient has been seen and evaluated by me, Leeann Oleary MD.              Chief Complaint:   SROM    History is obtained from the patient         History of Present Illness:   This patient is a 32 year old female who presents with SROM this evening. Known breech presentation, had previously declined version. GDMA1.              Past Medical History:     Past Medical History:   Diagnosis Date     NO ACTIVE PROBLEMS              Past Surgical History:     Past Surgical History:   Procedure Laterality Date     NO HISTORY OF SURGERY              Social History:   I have reviewed this patient's social history          Family History:   This patient has no significant family history          Immunizations:   Immunizations are up to date         Allergies:     Allergies   Allergen Reactions     Amoxicillin      Latex      Pcn [Penicillin G]              Medications:     No prescriptions prior to admission.             Review of Systems:   The 10 point Review of Systems is negative other than noted in the HPI              Physical Exam:   Vitals were reviewed  Temp: 98.9  F (37.2  C) Temp src: Oral BP: 133/84 Pulse: 88   Resp: 18        Gen--NAD  CV--RRR  Abd--Gravid, NTTP, EFW 8#  Grossly ruptured membranes       Data:   Breech on bedside US--fetal head on maternal left side.     Leeann Oleary MD

## 2018-07-02 NOTE — ANESTHESIA CARE TRANSFER NOTE
Patient: Rachael Peña    Procedure(s):   section  - Wound Class: II-Clean Contaminated    Diagnosis: breeched   Diagnosis Additional Information: No value filed.    Anesthesia Type:   Spinal     Note:  Airway :Room Air  Patient transferred to:Labor and Delivery  Handoff Report: Identifed the Patient, Identified the Reponsible Provider, Reviewed the pertinent medical history, Discussed the surgical course, Reviewed Intra-OP anesthesia mangement and issues during anesthesia, Set expectations for post-procedure period and Allowed opportunity for questions and acknowledgement of understanding      Vitals: (Last set prior to Anesthesia Care Transfer)    CRNA VITALS  2018 0151 - 2018 0225      2018             Pulse: 105    SpO2: 93 %                Electronically Signed By: FABIENNE Boogie CRNA  2018  2:25 AM

## 2018-07-02 NOTE — BRIEF OP NOTE
[facility] Brief Operative Note    Pre-operative diagnosis: 1. 31yo  at 37.1 with SROM  2. Breech fetal presentation    Post-operative diagnosis 1. Same  2. Normal pelvis   Procedure: Procedure(s):   section  - Wound Class: II-Clean Contaminated   Surgeon(s): Surgeon(s) and Role:     * Leeann Oleary MD - Primary   Estimated blood loss: 600cc    Specimens:   ID Type Source Tests Collected by Time Destination   1 :  Cord blood Blood OR DOCUMENTATION ONLY Sierra Charles RN 2018  1:59 AM       Findings: Liveborn female, weight 7#9oz. Apgars 8, 9. Normal pelvis.     Leeann Oleary MD

## 2018-07-02 NOTE — PLAN OF CARE
Problem: Patient Care Overview  Goal: Plan of Care/Patient Progress Review  Outcome: No Change  VSS. Pt is pale per baseline, BPs wnl. Fundus firm, having some small clots pass. Good PO intake, low UOP. Bolus given after MIVF given at a high rate. Awaiting urine output to catch up. Has not gotten up yet, due to dehydration, and nausea. Bonding well with baby. Breastfeeding Q2hrs independently, with no assistance needed.

## 2018-07-02 NOTE — PROVIDER NOTIFICATION
07/02/18 0032   Provider Notification   Provider Name/Title Dr. Oleary   Method of Notification At Bedside   Dr. Oleary at bedside to discuss POC with pt.  Bedside ultrasound by MD to verify breech presentation.  Pt and spouse verbalized understanding of POC, to proceed with primary c/s for breech presentation.  Will prep pt for primary c/s.

## 2018-07-02 NOTE — PLAN OF CARE
Problem: Patient Care Overview  Goal: Plan of Care/Patient Progress Review  Outcome: Improving  Pt tx to MB just after 0430 accompanied by supportive FOB. Nausea is improving, tolerating sips of water. Denies pain thus far. States she does not have much feeling in her lower extremities yet. Blanco patent and draining. Breastfeeding well, resting between feedings.

## 2018-07-03 LAB — HGB BLD-MCNC: 9.7 G/DL (ref 11.7–15.7)

## 2018-07-03 PROCEDURE — 25000132 ZZH RX MED GY IP 250 OP 250 PS 637: Performed by: OBSTETRICS & GYNECOLOGY

## 2018-07-03 PROCEDURE — 25000128 H RX IP 250 OP 636: Performed by: OBSTETRICS & GYNECOLOGY

## 2018-07-03 PROCEDURE — 40000084 ZZH STATISTIC IP LACTATION SERVICES 16-30 MIN

## 2018-07-03 PROCEDURE — 85018 HEMOGLOBIN: CPT | Performed by: OBSTETRICS & GYNECOLOGY

## 2018-07-03 PROCEDURE — 12000027 ZZH R&B OB

## 2018-07-03 PROCEDURE — 36415 COLL VENOUS BLD VENIPUNCTURE: CPT | Performed by: OBSTETRICS & GYNECOLOGY

## 2018-07-03 RX ORDER — FERROUS SULFATE 325(65) MG
325 TABLET ORAL DAILY
Status: DISCONTINUED | OUTPATIENT
Start: 2018-07-03 | End: 2018-07-05 | Stop reason: HOSPADM

## 2018-07-03 RX ADMIN — IBUPROFEN 800 MG: 800 TABLET ORAL at 21:46

## 2018-07-03 RX ADMIN — FERROUS SULFATE TAB 325 MG (65 MG ELEMENTAL FE) 325 MG: 325 (65 FE) TAB at 09:56

## 2018-07-03 RX ADMIN — SENNOSIDES AND DOCUSATE SODIUM 1 TABLET: 8.6; 5 TABLET ORAL at 21:46

## 2018-07-03 RX ADMIN — KETOROLAC TROMETHAMINE 30 MG: 30 INJECTION, SOLUTION INTRAMUSCULAR at 01:09

## 2018-07-03 RX ADMIN — IBUPROFEN 800 MG: 800 TABLET ORAL at 15:17

## 2018-07-03 NOTE — PLAN OF CARE
"Problem: Patient Care Overview  Goal: Plan of Care/Patient Progress Review  Outcome: No Change  VSS, patient has continued to decline all offers of analgesia, states pain score 4 but states 'it is the surgery\" and although appears uncomfortable to this writer declines intervention, heat pad offered and declined,, is using abdominal binder. Up independent but slowly ambulating   in room, voiding without difficulty since still removed this am. BS active, passing gas and has had a BM this morning. Barrier film to incision intact. Seen by lactation. Sp[ouse present and supportive.      "

## 2018-07-03 NOTE — PLAN OF CARE
Problem: Patient Care Overview  Goal: Plan of Care/Patient Progress Review  Outcome: Improving  Stable patient, meeting expected goals. Adequate urine output, still d/c this AM 0635, pt is yet to void. Pt reports good pain control with Toradol, refused Tylenol. Pt was able to ambulate to the bathroom and tolerated well. Breastfeeding with minimal assistance and is attentive to infant's needs. Spouse present at bedside and is supportive, continue to monitor.

## 2018-07-03 NOTE — PLAN OF CARE
Problem: Patient Care Overview  Goal: Plan of Care/Patient Progress Review  Outcome: No Change  Pt meets expected goals, having adequate urine output, denies nausea and pain, updated Dr Peña around 1720 this shift about pt's progress with urine output; pt was out of bed before 1800, was dangling, then stood up and ambulated to the bathroom, wanted to remain in sitting position with feet on the floor for an hour, tolerated well; rated incisional pain low, controlled with toradol, refused scheduled tylenol, rationale was explained to the pt, still wanted to skip at 2030; breast feeding well in foot ball position, mlc and hydrogel are given and explained to pt and spouse, all cares are reviewed with pt and spouse.

## 2018-07-03 NOTE — LACTATION NOTE
LC to see patient.  She was requesting to initiate pumping due to concerns that she did not have an adequate milk supply when baby cluster feeds.  LC assisted with HE and an abundance of colostrum noted.  Her baby latches well, however becomes sleepy with swaddler.  LC removed infant swaddler and assisted with positioning a deeper latch.  Good swallows noted.  LC reviewed the difference between active and comfort sucking.  LC also encouraged her to offer both sides with each nursing session and use breast compressions to keep infant active.  Cluster feeding was also reviewed.  No need for pumping at this time.

## 2018-07-03 NOTE — PROGRESS NOTES
Doing well    vss afeb  Abdomen soft and nt  Fundus firm and nt  Incision dry and intact  Extremities +3 edema    Hgb=9.7    A: POD 1 s/p Primary LTCS for breech =SROM at 37+ weeks      Doing well    P: routine post op care

## 2018-07-04 PROCEDURE — 12000027 ZZH R&B OB

## 2018-07-04 PROCEDURE — 40000084 ZZH STATISTIC IP LACTATION SERVICES 16-30 MIN

## 2018-07-04 PROCEDURE — 25000132 ZZH RX MED GY IP 250 OP 250 PS 637: Performed by: OBSTETRICS & GYNECOLOGY

## 2018-07-04 PROCEDURE — 40000083 ZZH STATISTIC IP LACTATION SERVICES 1-15 MIN

## 2018-07-04 RX ORDER — AMOXICILLIN 250 MG
1 CAPSULE ORAL 2 TIMES DAILY PRN
Qty: 40 TABLET | Refills: 0 | Status: SHIPPED | OUTPATIENT
Start: 2018-07-04

## 2018-07-04 RX ORDER — AMOXICILLIN 250 MG
1 CAPSULE ORAL 2 TIMES DAILY PRN
Qty: 40 TABLET | Refills: 0 | Status: SHIPPED | OUTPATIENT
Start: 2018-07-04 | End: 2018-07-04

## 2018-07-04 RX ADMIN — SENNOSIDES AND DOCUSATE SODIUM 1 TABLET: 8.6; 5 TABLET ORAL at 21:13

## 2018-07-04 RX ADMIN — IBUPROFEN 800 MG: 800 TABLET ORAL at 05:40

## 2018-07-04 RX ADMIN — IBUPROFEN 800 MG: 800 TABLET ORAL at 23:45

## 2018-07-04 RX ADMIN — SENNOSIDES AND DOCUSATE SODIUM 1 TABLET: 8.6; 5 TABLET ORAL at 08:58

## 2018-07-04 RX ADMIN — IBUPROFEN 800 MG: 800 TABLET ORAL at 12:13

## 2018-07-04 RX ADMIN — IBUPROFEN 800 MG: 800 TABLET ORAL at 18:06

## 2018-07-04 RX ADMIN — FERROUS SULFATE TAB 325 MG (65 MG ELEMENTAL FE) 325 MG: 325 (65 FE) TAB at 08:58

## 2018-07-04 NOTE — PROGRESS NOTES
"POD2 1/2  Feels well, pain controlled with ibuprofen  /73  Pulse 88  Temp 98.1  F (36.7  C) (Oral)  Resp 16  Ht 1.549 m (5' 1\")  Wt 88.5 kg (195 lb)  SpO2 99%  Breastfeeding? Unknown  BMI 36.84 kg/m2   NAD  Abd Soft, ND  Inc: CDI    POD 2 1/2 s/p LTCS  Await  jaundice screen  OK to discharge unless baby stays longer, then may cancel d/c  Reviewed d/c instructions    Brisa Peña     "

## 2018-07-04 NOTE — LACTATION NOTE
Lactation visit for latching assistance. Luck latched in cradle hold on left breast. Patient states that is is painful. Un-latched  and assisted with latching using Latch 1,2,3 and cross cradle positioning. Luck latched well and no discomfort noted by patient. Encouraged breast compression with feeding and demonstrated to patient with increased swallows pointed out. Patient asked writer to show her hand expression on right breast during feeding. Provided education and demonstration. Large drops of colostrum easily expressible at this time. Encouraged her to call for further assistance PRN.

## 2018-07-04 NOTE — LACTATION NOTE
Lactation visit. Patient states  is nursing well. Nipples are slightly tender, but no damage noted. Discussed Latch 1,2,3 and encouraged her to call for latching assistance or assessment with feeding to ensure deep enough latch. She does not have any questions at this time, but will call for next feeding.

## 2018-07-04 NOTE — PLAN OF CARE
Problem: Patient Care Overview  Goal: Plan of Care/Patient Progress Review  Outcome: Improving  VSS, patient taking ibuprofen for incisional pain, declines other analgesia. Walked the hallways today. Seen by lactation see note. Anticiapte discharge tomorrow.

## 2018-07-04 NOTE — PLAN OF CARE
Problem: Patient Care Overview  Goal: Plan of Care/Patient Progress Review  Outcome: Improving  Pt stable, vitals WNL. Breastfeeding well ind. Pt agreed to take ibuprofen which helped, encouraged to fall out if needs more pain management. Pt voiding and ambulating ind.

## 2018-07-04 NOTE — PLAN OF CARE
Problem: Patient Care Overview  Goal: Plan of Care/Patient Progress Review  Outcome: Improving  VSS. Ibuprofen given for pain. Independent with nursing and  cares. Would like a breast pump. SO at bedside, supportive.

## 2018-07-05 VITALS
SYSTOLIC BLOOD PRESSURE: 122 MMHG | HEIGHT: 61 IN | OXYGEN SATURATION: 99 % | HEART RATE: 70 BPM | RESPIRATION RATE: 18 BRPM | DIASTOLIC BLOOD PRESSURE: 79 MMHG | WEIGHT: 195 LBS | TEMPERATURE: 97.9 F | BODY MASS INDEX: 36.82 KG/M2

## 2018-07-05 PROCEDURE — 25000132 ZZH RX MED GY IP 250 OP 250 PS 637: Performed by: OBSTETRICS & GYNECOLOGY

## 2018-07-05 PROCEDURE — 40000083 ZZH STATISTIC IP LACTATION SERVICES 1-15 MIN

## 2018-07-05 RX ORDER — AMMONIA INHALANTS 0.04 G/.3ML
INHALANT RESPIRATORY (INHALATION)
Status: DISCONTINUED
Start: 2018-07-05 | End: 2018-07-05 | Stop reason: HOSPADM

## 2018-07-05 RX ADMIN — FERROUS SULFATE TAB 325 MG (65 MG ELEMENTAL FE) 325 MG: 325 (65 FE) TAB at 09:05

## 2018-07-05 RX ADMIN — IBUPROFEN 800 MG: 800 TABLET ORAL at 07:13

## 2018-07-05 RX ADMIN — SENNOSIDES AND DOCUSATE SODIUM 1 TABLET: 8.6; 5 TABLET ORAL at 09:05

## 2018-07-05 NOTE — PLAN OF CARE
Problem: Patient Care Overview  Goal: Plan of Care/Patient Progress Review  Outcome: Improving  VSS. Taking only ibuprofen for pain. Wearing abdominal binder. Independent with breastfeeding and  cares. Also pumping for jaundiced  on bili lights and finger feeding/bottle EBM. SO at bedside and very supportive. Unable to ask abuse screen question (either verbal or written) as FOB was present and active in all cares.

## 2018-07-05 NOTE — PLAN OF CARE
Problem: Patient Care Overview  Goal: Plan of Care/Patient Progress Review  Outcome: Adequate for Discharge Date Met: 07/05/18  Data: Vital signs within normal limits. Postpartum checks within normal limits - see flow record. Patient eating and drinking normally. Patient able to empty bladder independently and is up ambulating. No apparent signs of infection. Incision healing well.Barrier film to incision intact patient will remove in one week post surgery.   Patient performing self cares and is able to care for infant.  Action: Patient medicated during the shift for pain. See MAR. Patient continues to take ibuprofen for incisional p[ain, declines other analgesia.Patient reassessed within 1 hour after each medication and pain was improved - patient stated she was comfortable. Patient education done about . See flow record.Seen by DR Hu, patient fit for discharge today , routine follow up in 6 weeks for postpartum check. reaasureed by MD pelayo edema of 3+.  Response: Positive attachment behaviors observed with infant. Support persons  Spouse  present.   Plan:  discharge today 7/5  Discharge papers signed and ready to leave unit at 13.30

## 2018-07-05 NOTE — PLAN OF CARE
Problem: Patient Care Overview  Goal: Plan of Care/Patient Progress Review  Outcome: No Change  Pt meets expected goals, pain is controlled with ibuprofen, pt refused scheduled tylenol; pt has started pumping and has colostrum after feedings, expectations are explained to pt and spouse, answered questions about milk storage, explained on how to clean the accessories after pumping; informed mother about discharge process for tomorrow; pleasant, both are bonding with infant well, father of the baby is on top of infant's cares.

## 2018-07-05 NOTE — LACTATION NOTE
Lactation visit. Patient states nursing is going well, improving from yesterday. She is pumping to supplement  d/t increased jaundice, getting 10-15mL per session. She does not have any questions at this time. Encouraged her to call PRN.

## 2018-07-05 NOTE — DISCHARGE INSTRUCTIONS
Postop  Birth Instructions   Schedule  Clinic appointment in 6 weeks .   [facility] .      Lactation     Activity       Do not lift more than 10 pounds for 6 weeks after surgery.  Ask family and friends for help when you need it.    No driving until you have stopped taking your pain medications (usually two weeks after surgery).    No heavy exercise or activity for 6 weeks.  Don't do anything that will put a strain on your surgery site.    Don't strain when using the toilet.  Your care team may prescribe a stool softener if you have problems with your bowel movements.     To care for your incision:       Keep the incision clean and dry.    Do not soak your incision in water. No swimming or hot tubs until it has fully healed. You may soak in the bathtub if the water level is below your incision.    Do not use peroxide, gel, cream, lotion, or ointment on your incision.    Adjust your clothes to avoid pressure on your surgery site (check the elastic in your underwear for example).     You may see a small amount of clear or pink drainage and this is normal.  Check with your health care provider:       If the drainage increases or has an odor.    If the incision reddens, you have swelling, or develop a rash.    If you have increased pain and the medicine we prescribed doesn't help.    If you have a fever above 100.4 F (38 C) with or without chills when placing thermometer under your tongue.   The area around your incision (surgery wound), will feel numb.  This is normal. The numbness should go away in less than a year.     Keep your hands clean:  Always wash your hands before touching your incision (surgery wound). This helps reduce your risk of infection. If your hands aren't dirty, you may use an alcohol hand-rub to clean your hands. Keep your nails clean and short.    Call your healthcare provider if you have any of these symptoms:       You soak a sanitary pad with blood within  1 hour, or you see blood clots larger than a golf ball.    Bleeding that lasts more than 6 weeks.    Vaginal discharge that smells bad.    Severe pain, cramping or tenderness in your lower belly area.    A need to urinate more frequently (use the toilet more often), more urgently (use the toilet very quickly), or it burns when you urinate.    Nausea and vomiting.    Redness, swelling or pain around a vein in your leg.    Problems breastfeeding or a red or painful area on your breast.    Chest pain and cough or are gasping for air.    Problems with coping with sadness, anxiety or depression. If you have concerns about hurting yourself or the baby, call your provider immediately.      You have questions or concerns after you return home.

## 2018-07-06 ENCOUNTER — APPOINTMENT (OUTPATIENT)
Dept: GENERAL RADIOLOGY | Facility: CLINIC | Age: 33
End: 2018-07-06
Attending: EMERGENCY MEDICINE
Payer: COMMERCIAL

## 2018-07-06 ENCOUNTER — HOSPITAL ENCOUNTER (INPATIENT)
Facility: CLINIC | Age: 33
LOS: 1 days | Discharge: HOME | End: 2018-07-07
Attending: EMERGENCY MEDICINE | Admitting: OBSTETRICS & GYNECOLOGY
Payer: COMMERCIAL

## 2018-07-06 DIAGNOSIS — J81.0 ACUTE PULMONARY EDEMA (H): ICD-10-CM

## 2018-07-06 PROBLEM — J81.1 PULMONARY EDEMA: Status: ACTIVE | Noted: 2018-07-06

## 2018-07-06 LAB
ALBUMIN SERPL-MCNC: 2.4 G/DL (ref 3.4–5)
ALP SERPL-CCNC: 80 U/L (ref 40–150)
ALT SERPL W P-5'-P-CCNC: 67 U/L (ref 0–50)
ANION GAP SERPL CALCULATED.3IONS-SCNC: 5 MMOL/L (ref 3–14)
AST SERPL W P-5'-P-CCNC: 35 U/L (ref 0–45)
BASOPHILS # BLD AUTO: 0 10E9/L (ref 0–0.2)
BASOPHILS NFR BLD AUTO: 0.2 %
BILIRUB DIRECT SERPL-MCNC: <0.1 MG/DL (ref 0–0.2)
BILIRUB SERPL-MCNC: 0.2 MG/DL (ref 0.2–1.3)
BUN SERPL-MCNC: 12 MG/DL (ref 7–30)
CALCIUM SERPL-MCNC: 8.7 MG/DL (ref 8.5–10.1)
CHLORIDE SERPL-SCNC: 110 MMOL/L (ref 94–109)
CO2 SERPL-SCNC: 26 MMOL/L (ref 20–32)
CREAT SERPL-MCNC: 0.72 MG/DL (ref 0.52–1.04)
DIFFERENTIAL METHOD BLD: ABNORMAL
EOSINOPHIL # BLD AUTO: 0.1 10E9/L (ref 0–0.7)
EOSINOPHIL NFR BLD AUTO: 1.2 %
ERYTHROCYTE [DISTWIDTH] IN BLOOD BY AUTOMATED COUNT: 13.5 % (ref 10–15)
GFR SERPL CREATININE-BSD FRML MDRD: >90 ML/MIN/1.7M2
GLUCOSE SERPL-MCNC: 87 MG/DL (ref 70–99)
HCT VFR BLD AUTO: 32.7 % (ref 35–47)
HGB BLD-MCNC: 10 G/DL (ref 11.7–15.7)
IMM GRANULOCYTES # BLD: 0.1 10E9/L (ref 0–0.4)
IMM GRANULOCYTES NFR BLD: 0.7 %
LYMPHOCYTES # BLD AUTO: 1.5 10E9/L (ref 0.8–5.3)
LYMPHOCYTES NFR BLD AUTO: 14.7 %
MCH RBC QN AUTO: 28.1 PG (ref 26.5–33)
MCHC RBC AUTO-ENTMCNC: 30.6 G/DL (ref 31.5–36.5)
MCV RBC AUTO: 92 FL (ref 78–100)
MONOCYTES # BLD AUTO: 0.8 10E9/L (ref 0–1.3)
MONOCYTES NFR BLD AUTO: 7.7 %
NEUTROPHILS # BLD AUTO: 7.9 10E9/L (ref 1.6–8.3)
NEUTROPHILS NFR BLD AUTO: 75.5 %
NRBC # BLD AUTO: 0 10*3/UL
NRBC BLD AUTO-RTO: 0 /100
NT-PROBNP SERPL-MCNC: 946 PG/ML (ref 0–450)
PLATELET # BLD AUTO: 313 10E9/L (ref 150–450)
POTASSIUM SERPL-SCNC: 4.6 MMOL/L (ref 3.4–5.3)
PROT SERPL-MCNC: 5.9 G/DL (ref 6.8–8.8)
RBC # BLD AUTO: 3.56 10E12/L (ref 3.8–5.2)
SODIUM SERPL-SCNC: 141 MMOL/L (ref 133–144)
WBC # BLD AUTO: 10.5 10E9/L (ref 4–11)

## 2018-07-06 PROCEDURE — 12000000 ZZH R&B MED SURG/OB

## 2018-07-06 PROCEDURE — 80048 BASIC METABOLIC PNL TOTAL CA: CPT | Performed by: EMERGENCY MEDICINE

## 2018-07-06 PROCEDURE — 83880 ASSAY OF NATRIURETIC PEPTIDE: CPT | Performed by: EMERGENCY MEDICINE

## 2018-07-06 PROCEDURE — 71046 X-RAY EXAM CHEST 2 VIEWS: CPT

## 2018-07-06 PROCEDURE — 99285 EMERGENCY DEPT VISIT HI MDM: CPT | Mod: 25

## 2018-07-06 PROCEDURE — 25000132 ZZH RX MED GY IP 250 OP 250 PS 637: Performed by: EMERGENCY MEDICINE

## 2018-07-06 PROCEDURE — 80076 HEPATIC FUNCTION PANEL: CPT | Performed by: EMERGENCY MEDICINE

## 2018-07-06 PROCEDURE — 85025 COMPLETE CBC W/AUTO DIFF WBC: CPT | Performed by: EMERGENCY MEDICINE

## 2018-07-06 RX ORDER — FUROSEMIDE 10 MG/ML
20 INJECTION INTRAMUSCULAR; INTRAVENOUS ONCE
Status: DISCONTINUED | OUTPATIENT
Start: 2018-07-06 | End: 2018-07-06

## 2018-07-06 RX ORDER — MAGNESIUM SULFATE HEPTAHYDRATE 40 MG/ML
4 INJECTION, SOLUTION INTRAVENOUS
Status: DISCONTINUED | OUTPATIENT
Start: 2018-07-06 | End: 2018-07-07 | Stop reason: HOSPADM

## 2018-07-06 RX ORDER — HYDRALAZINE HYDROCHLORIDE 20 MG/ML
5 INJECTION INTRAMUSCULAR; INTRAVENOUS
Status: DISCONTINUED | OUTPATIENT
Start: 2018-07-06 | End: 2018-07-07 | Stop reason: HOSPADM

## 2018-07-06 RX ORDER — FUROSEMIDE 20 MG
20 TABLET ORAL ONCE
Status: COMPLETED | OUTPATIENT
Start: 2018-07-06 | End: 2018-07-06

## 2018-07-06 RX ORDER — LIDOCAINE 40 MG/G
CREAM TOPICAL
Status: DISCONTINUED | OUTPATIENT
Start: 2018-07-06 | End: 2018-07-06 | Stop reason: CLARIF

## 2018-07-06 RX ORDER — NIFEDIPINE 10 MG/1
10 CAPSULE ORAL
Status: DISCONTINUED | OUTPATIENT
Start: 2018-07-06 | End: 2018-07-07 | Stop reason: HOSPADM

## 2018-07-06 RX ORDER — MAGNESIUM SULFATE HEPTAHYDRATE 40 MG/ML
2 INJECTION, SOLUTION INTRAVENOUS
Status: DISCONTINUED | OUTPATIENT
Start: 2018-07-06 | End: 2018-07-07 | Stop reason: HOSPADM

## 2018-07-06 RX ORDER — MAGNESIUM SULFATE HEPTAHYDRATE 500 MG/ML
4 INJECTION, SOLUTION INTRAMUSCULAR; INTRAVENOUS
Status: DISCONTINUED | OUTPATIENT
Start: 2018-07-06 | End: 2018-07-07 | Stop reason: HOSPADM

## 2018-07-06 RX ORDER — LORAZEPAM 2 MG/ML
2 INJECTION INTRAMUSCULAR
Status: DISCONTINUED | OUTPATIENT
Start: 2018-07-06 | End: 2018-07-07 | Stop reason: HOSPADM

## 2018-07-06 RX ORDER — HYDRALAZINE HYDROCHLORIDE 20 MG/ML
10 INJECTION INTRAMUSCULAR; INTRAVENOUS
Status: DISCONTINUED | OUTPATIENT
Start: 2018-07-06 | End: 2018-07-07 | Stop reason: HOSPADM

## 2018-07-06 RX ADMIN — FUROSEMIDE 20 MG: 20 TABLET ORAL at 18:53

## 2018-07-06 ASSESSMENT — PAIN DESCRIPTION - DESCRIPTORS: DESCRIPTORS: ACHING

## 2018-07-06 ASSESSMENT — ENCOUNTER SYMPTOMS
FEVER: 0
RHINORRHEA: 1
COUGH: 1

## 2018-07-06 NOTE — ED PROVIDER NOTES
"  History     Chief Complaint:  Postpartum Complications      HPI   Rachael Peña is a 32 year old female status post C section on  who presents with postpartum complication. The patient states that she did receive fluid while in the hospital, but notes that her bilateral leg swelling has been improving. Since being discharged from the hospital, she has had a cough as well as nasal discharge. She states that there were no sick contacts and denies any fevers. The patient has been urinating normally and notes that she is breastfeeding.      Allergies:  Amoxicillin   Latex   Penicillins     Medications:    Senna-Docusate     Past Medical History:    History reviewed.  No significant past medical history.      Past Surgical History:     section     Family History:    History reviewed. No pertinent family history.     Social History:  Marital Status:   Presents to the ED with   Tobacco Use: Never Used  Alcohol Use: No  PCP: Christopher Lennox Balgobin      Review of Systems   Constitutional: Negative for fever.   HENT: Positive for postnasal drip and rhinorrhea.    Respiratory: Positive for cough.    Cardiovascular: Positive for leg swelling.   All other systems reviewed and are negative.      Physical Exam   First Vitals:  BP: 142/77  Pulse: 88  Heart Rate: 69  Temp: 99  F (37.2  C)  Resp: 18  Height: 154.9 cm (5' 1\")  SpO2: 98 %      Physical Exam   Constitutional: She appears well-developed and well-nourished.   HENT:   Right Ear: External ear normal.   Left Ear: External ear normal.   Mouth/Throat: Oropharynx is clear and moist. No oropharyngeal exudate.   TM's clear bilaterally   Eyes: Conjunctivae are normal. Pupils are equal, round, and reactive to light. No scleral icterus.   Neck: Normal range of motion. Neck supple. No JVD present.   Cardiovascular: Normal rate, regular rhythm, normal heart sounds and intact distal pulses.  Exam reveals no gallop and no friction rub.    No murmur " heard.  Pulmonary/Chest: Effort normal and breath sounds normal. No respiratory distress. She has no wheezes. She has no rales.   Abdominal: Soft. Bowel sounds are normal. She exhibits no distension and no mass. There is tenderness.   Mildly tender along C section incision, no fundal tenderness   Musculoskeletal: Normal range of motion. She exhibits edema.   3+ BLE edema   Neurological: She is alert.   Skin: Skin is warm and dry. No rash noted.   Psychiatric: She has a normal mood and affect.       Emergency Department Course   Imaging:  Chest x-ray, PA & LAT:   Small bilateral pleural effusions are new since the  previous exam. No other significant interval change. Heart size and  pulmonary vascularity are within normal limits. Lungs clear.  Report per radiology.   Radiographic findings were communicated with the patient who voiced understanding of the findings.    Laboratory:  BMP: Chloride 110 (H), otherwise WNL (Creatinine 0.72)   CBC:  WBC 10.5, HGB 10.0 (L),   BNP: 946 (H)   Hepatic panel: Bili Direct <0.1, Bili Total 0.2, Albumin 2.4 (L), Protein Total 5.9 (L), Alkphos 80, ALT 67 (H), AST 35.     Interventions:   Lasix, 20 mg, PO     Emergency Department Course:  Nursing notes and vitals reviewed.  (1718) I performed an exam of the patient as documented above.    A peripheral IV was established. Blood was drawn from the patient. This was sent for laboratory testing, findings above.    The patient was sent for a chest x-ray while in the emergency department, findings above.   (1838) I consulted with Dr. Santacruz of OB/GYN regarding the patient.   Findings and plan explained to the patient who consents to admission.   (1925) I discussed the patient with Dr. Santacruz of the OB/GYN service, who will admit the patient to a medical bed for further monitoring, evaluation, and treatment.       Impression & Plan    Medical Decision Making:  Rachael Peña is a 32 year old female who presents with feeling of shortness  of breath and cough and concern for too much fluid. The patient did recently get discharged from the hospital after a c section and they mentioned that she received many bags of fluid while here. The swelling in her legs she did admit was better. She has been urinating normally and quite frequently. She says she has had a dry cough which is not persistent here in the ER. Her x-ray did show some very small pleural effusions bilaterally. She has a mildly elevated BNP. Blood pressure is borderline initially but then did trend downward. We did check LFTs and ALT was elevated. Likely the mild fluid overload is from the excessive IV fluid administration during her hospital stay but due to the difference in her blood pressures and elevated LFT, Dr. Evelin Santacruz will be admitting her for observation overnight. She will continue to breastfeed here. She did not agree to have an IV and only wanted to take her lasix orally so that was given to her orally. Otherwise patient is admitted to postpartum L& D area.      Diagnosis:  1. Pulmonary edema status post C section.       Disposition:  Admitted to OB/GYN      I, Yue Marcano, am serving as a scribe on 7/6/2018 at 5:18 PM to personally document services performed by Dr. Muse based on my observations and the provider's statements to me.    7/6/2018   [facility] EMERGENCY DEPARTMENT       Alejandro Muse MD  07/06/18 0221

## 2018-07-06 NOTE — ED TRIAGE NOTES
"ABCs intact. Pt had a  18 d/t a breach baby. Pt c/o cold symptoms, nasal drainage, watery eyes, cough. Pt c/o lower back and incision pain. Pt states she \"hears something\" when breathing and coughing. Pt states she may have fluid in her lungs d/t all of the IV fluids she received.   "

## 2018-07-06 NOTE — IP AVS SNAPSHOT
MRN:[id number]                      After Visit Summary   7/6/2018    Rachael Peña    MRN: [id number]           Thank you!     Thank you for choosing [facility] for your care. Our goal is always to provide you with excellent care. Hearing back from our patients is one way we can continue to improve our services. Please take a few minutes to complete the written survey that you may receive in the mail after you visit. If you would like to speak to someone directly about your visit please contact Patient Relations at [phone]. Thank you!          Patient Information     Date Of Birth          1985        Designated Caregiver       Most Recent Value    Caregiver    Will someone help with your care after discharge? yes    Name of designated caregiver Edward    Phone number of caregiver [phone]    Caregiver address [address] [place] [address]      About your hospital stay     You were admitted on:  July 6, 2018 You last received care in the:  [place] [address] [facility]    You were discharged on:  July 7, 2018       Who to Call     For medical emergencies, please call 911.  For non-urgent questions about your medical care, please call your primary care provider or clinic, [phone]          Attending Provider     Provider Specialty    Alejandro Muse MD Emergency Medicine    Viky Santacruz MD OB/Gyn       Primary Care Provider Office Phone # Fax #    Christopher Lennox Jeovanny Ramirez [phone]      After Care Instructions     Discharge Instructions       Continue lasix for 2-3 more days as outatient     Avoid NSAIDS , ambulate as much as possible      Follow up with OB gyn                  Further instructions from your care team       Please call the OBGYN office Monday morning (7/9) to schedule a follow-up appointment and blood pressure check.  Please call/return sooner if you have: chest pain, palpitations, worsening shortness of breath,  "severe persistent headache, visual changes, or for any other major concern.  Take your lasix once a day for the next few days.    Avoid NSAIDs (such as Aspirin, Ibuprofen, Advil)    Pending Results     No orders found for last 3 day(s).            Statement of Approval     Ordered          18 1500  I have reviewed and agree with all the recommendations and orders detailed in this document.  EFFECTIVE NOW     Approved and electronically signed by:  Magdi Huntley MD             Admission Information     Date & Time Provider Department Dept. Phone    2018 Viky Santacruz MD [place] [address] [facility] [phone]      Your Vitals Were     Blood Pressure Pulse Temperature Respirations Height Weight    121/54 (BP Location: Left arm) 75 98.5  F (36.9  C) (Oral) 18 1.549 m (5' 1\") 80.9 kg (178 lb 4.8 oz)    Pulse Oximetry BMI (Body Mass Index)                94% 33.69 kg/m2          MyChart Information     [facility] lets you send messages to your doctor, view your test results, renew your prescriptions, schedule appointments and more. To sign up, go to [email or web address] . Click on \"Log in\" on the left side of the screen, which will take you to the Welcome page. Then click on \"Sign up Now\" on the right side of the page.     You will be asked to enter the access code listed below, as well as some personal information. Please follow the directions to create your username and password.     Your access code is: [email or web address]  Expires: 10/3/2018  9:27 AM     Your access code will  in 90 days. If you need help or a new code, please call your [facility] clinic or [phone].        Care EveryWhere ID     This is your Care EveryWhere ID. This could be used by other organizations to access your [facility] medical records  [id number]        Equal Access to Services     SERJIO TINOCO AH: Aida Bailey, gina ferrara, roberta olivas " ah. So [facility] [phone].    ATENCIÓN: Si habla lucita, tiene a lagos disposición servicios gratuitos de asistencia lingüística. Donnie nair [phone].    We comply with applicable federal civil rights laws and [place] laws. We do not discriminate on the basis of race, color, national origin, age, disability, sex, sexual orientation, or gender identity.               Review of your medicines      START taking        Dose / Directions    furosemide 20 MG tablet   Commonly known as:  LASIX        Dose:  20 mg   Take 1 tablet (20 mg) by mouth daily for 3 days   Quantity:  3 tablet   Refills:  0         CONTINUE these medicines which have NOT CHANGED        Dose / Directions    senna-docusate 8.6-50 MG per tablet   Commonly known as:  SENOKOT-S;PERICOLACE   Used for:   delivery delivered        Dose:  1 tablet   Take 1 tablet by mouth 2 times daily as needed for constipation   Quantity:  40 tablet   Refills:  0            Where to get your medicines      These medications were sent to [facility] [address] [place] [id number] [employer] [address]     Phone:  [phone]     furosemide 20 MG tablet                Protect others around you: Learn how to safely use, store and throw away your medicines at www.disposemymeds.org.             Medication List: This is a list of all your medications and when to take them. Check marks below indicate your daily home schedule. Keep this list as a reference.      Medications           Morning Afternoon Evening Bedtime As Needed    furosemide 20 MG tablet   Commonly known as:  LASIX   Take 1 tablet (20 mg) by mouth daily for 3 days   Last time this was given:  20 mg on 2018  1:43 PM   Next Dose Due:  , with breakfast                                    senna-docusate 8.6-50 MG per tablet   Commonly known as:  SENOKOT-S;PERICOLACE   Take 1 tablet by mouth 2 times daily as needed for constipation                                              More Information        Pulmonary Edema  Your healthcare provider has told you that you have pulmonary edema. Read on to learn more about pulmonary edema and how it can be treated.  What is pulmonary edema?     Pulmonary edema is fluid in the air sacs (alveoli) in the lungs.   Pulmonary edema occurs when the air sacs (alveoli) in your lungs fill with fluid. The fluid buildup makes it hard for the lungs to do their job, including getting oxygen from the air you breathe. This can make it hard to breathe. The most common cause of pulmonary edema is heart failure. When the heart doesn t work properly, it can cause pressure to rise in the veins (blood vessels) of the lungs. As pressure builds, fluid leaks out of the congested veins. It fills the alveoli. The extra fluid prevents oxygen from moving through the lungs as it should. But heart failure isn t the only cause of pulmonary edema. Damage to the lungs or kidney failure can also cause fluid to fill the lungs. And in some cases, living or exercising at high altitudes can lead to fluid buildup in the lungs.  How is pulmonary edema diagnosed?  Your healthcare provider does an exam and asks about your health history. You may also have one or more of the following:    Blood tests to take samples of blood.    Imaging tests to take detailed pictures of inside the body. These may include a chest X-ray and ultrasound.    Electrocardiography (ECG)  and echocardiogram to test how well the heart is functioning.  How is pulmonary edema treated?  Treatment usually depends on what s causing the edema. For instance, if it s because of heart failure, treating the heart condition will treat the edema. Treatment can also ease symptoms. Therapy often includes the following:    Oxygen. This may be given through a mask that goes over the nose. It may be given through a small tube that sits under the nose. Sometimes, pressurized air will be needed through a tight  fitting mask, using a machine called CPAP or BiPAP. Or it may be given through a tube placed into the windpipe (trachea). If a tube is required, a ventilator, often called a breathing machine or respirator will be used.    Medicines. These may include water pills (diuretics) to help your body get rid of extra fluid. The fluid passes out of your body as urine. You may also need medicines to treat the heart. These can help your heart work better. This helps reduce fluid buildup in the lungs.  What are the long-term concerns?  If treated right away, pulmonary edema can be improved. It may even be cured. But in some cases, ongoing treatment is needed to help control the problem. This may require having procedures or taking medicines for months or years. In some cases, you may need to use oxygen or breathing equipment for a long time. This can lead to complications such as damage to lung tissue. Your healthcare provider can tell you more if needed.  Call 911  Call 911 if any of these occur:    Chest pain    Severe trouble breathing    Coughing up blood    Skin turns blue      When to call your healthcare provider  Call your healthcare provider right away if you have any of the following:    Unusual or irregular heartbeat    Unable to speak full sentences before running out of breath    Sweating more than usual   Date Last Reviewed: 2/1/2017 2000-2017 [employer]. [address] [place] [address], [place] [address]. All rights reserved. This information is not intended as a substitute for professional medical care. Always follow your healthcare professional's instructions.

## 2018-07-06 NOTE — IP AVS SNAPSHOT
[place] [id number] [facility]    [address]    [facility] [address]    Phone:  [phone]    Fax:  [phone]                                       After Visit Summary   7/6/2018    Rachael Peña    MRN: [id number]           After Visit Summary Signature Page     I have received my discharge instructions, and my questions have been answered. I have discussed any challenges I see with this plan with the nurse or doctor.    ..........................................................................................................................................  Patient/Patient Representative Signature      ..........................................................................................................................................  Patient Representative Print Name and Relationship to Patient    ..................................................               ................................................  Date                                            Time    ..........................................................................................................................................  Reviewed by Signature/Title    ...................................................              ..............................................  Date                                                            Time

## 2018-07-07 ENCOUNTER — APPOINTMENT (OUTPATIENT)
Dept: CARDIOLOGY | Facility: CLINIC | Age: 33
End: 2018-07-07
Attending: HOSPITALIST
Payer: COMMERCIAL

## 2018-07-07 VITALS
SYSTOLIC BLOOD PRESSURE: 121 MMHG | OXYGEN SATURATION: 94 % | HEART RATE: 75 BPM | BODY MASS INDEX: 33.66 KG/M2 | TEMPERATURE: 98.5 F | HEIGHT: 61 IN | RESPIRATION RATE: 18 BRPM | WEIGHT: 178.3 LBS | DIASTOLIC BLOOD PRESSURE: 54 MMHG

## 2018-07-07 LAB
ALT SERPL W P-5'-P-CCNC: 62 U/L (ref 0–50)
ANION GAP SERPL CALCULATED.3IONS-SCNC: 5 MMOL/L (ref 3–14)
AST SERPL W P-5'-P-CCNC: 29 U/L (ref 0–45)
BUN SERPL-MCNC: 10 MG/DL (ref 7–30)
CALCIUM SERPL-MCNC: 8.4 MG/DL (ref 8.5–10.1)
CHLORIDE SERPL-SCNC: 106 MMOL/L (ref 94–109)
CO2 SERPL-SCNC: 27 MMOL/L (ref 20–32)
CREAT SERPL-MCNC: 0.75 MG/DL (ref 0.52–1.04)
ERYTHROCYTE [DISTWIDTH] IN BLOOD BY AUTOMATED COUNT: 13.8 % (ref 10–15)
GFR SERPL CREATININE-BSD FRML MDRD: 89 ML/MIN/1.7M2
GLUCOSE SERPL-MCNC: 93 MG/DL (ref 70–99)
HCT VFR BLD AUTO: 33.1 % (ref 35–47)
HGB BLD-MCNC: 10.3 G/DL (ref 11.7–15.7)
MAGNESIUM SERPL-MCNC: 2 MG/DL (ref 1.6–2.3)
MCH RBC QN AUTO: 28.1 PG (ref 26.5–33)
MCHC RBC AUTO-ENTMCNC: 31.1 G/DL (ref 31.5–36.5)
MCV RBC AUTO: 90 FL (ref 78–100)
PLATELET # BLD AUTO: 309 10E9/L (ref 150–450)
POTASSIUM SERPL-SCNC: 3.8 MMOL/L (ref 3.4–5.3)
RBC # BLD AUTO: 3.67 10E12/L (ref 3.8–5.2)
SODIUM SERPL-SCNC: 138 MMOL/L (ref 133–144)
TSH SERPL DL<=0.005 MIU/L-ACNC: 1.84 MU/L (ref 0.4–4)
WBC # BLD AUTO: 9.2 10E9/L (ref 4–11)

## 2018-07-07 PROCEDURE — 83735 ASSAY OF MAGNESIUM: CPT | Performed by: OBSTETRICS & GYNECOLOGY

## 2018-07-07 PROCEDURE — 85027 COMPLETE CBC AUTOMATED: CPT | Performed by: OBSTETRICS & GYNECOLOGY

## 2018-07-07 PROCEDURE — 25000132 ZZH RX MED GY IP 250 OP 250 PS 637: Performed by: HOSPITALIST

## 2018-07-07 PROCEDURE — 99207 ZZC APP CREDIT; MD BILLING SHARED VISIT: CPT | Performed by: HOSPITALIST

## 2018-07-07 PROCEDURE — 99222 1ST HOSP IP/OBS MODERATE 55: CPT | Performed by: INTERNAL MEDICINE

## 2018-07-07 PROCEDURE — 93306 TTE W/DOPPLER COMPLETE: CPT

## 2018-07-07 PROCEDURE — 99207 ZZC CONSULT E&M CHANGED TO INITIAL LEVEL: CPT | Performed by: INTERNAL MEDICINE

## 2018-07-07 PROCEDURE — 84460 ALANINE AMINO (ALT) (SGPT): CPT | Performed by: OBSTETRICS & GYNECOLOGY

## 2018-07-07 PROCEDURE — 84443 ASSAY THYROID STIM HORMONE: CPT | Performed by: OBSTETRICS & GYNECOLOGY

## 2018-07-07 PROCEDURE — 84450 TRANSFERASE (AST) (SGOT): CPT | Performed by: OBSTETRICS & GYNECOLOGY

## 2018-07-07 PROCEDURE — 36415 COLL VENOUS BLD VENIPUNCTURE: CPT | Performed by: OBSTETRICS & GYNECOLOGY

## 2018-07-07 PROCEDURE — 80048 BASIC METABOLIC PNL TOTAL CA: CPT | Performed by: OBSTETRICS & GYNECOLOGY

## 2018-07-07 PROCEDURE — 93306 TTE W/DOPPLER COMPLETE: CPT | Mod: 26 | Performed by: INTERNAL MEDICINE

## 2018-07-07 RX ORDER — FUROSEMIDE 20 MG
20 TABLET ORAL ONCE
Status: COMPLETED | OUTPATIENT
Start: 2018-07-07 | End: 2018-07-07

## 2018-07-07 RX ORDER — FUROSEMIDE 20 MG
20 TABLET ORAL DAILY
Qty: 3 TABLET | Refills: 0 | Status: SHIPPED | OUTPATIENT
Start: 2018-07-07 | End: 2018-07-10

## 2018-07-07 RX ORDER — FUROSEMIDE 20 MG
20 TABLET ORAL DAILY
Qty: 2 TABLET | Refills: 0 | Status: SHIPPED | OUTPATIENT
Start: 2018-07-07 | End: 2018-07-07

## 2018-07-07 RX ADMIN — FUROSEMIDE 20 MG: 20 TABLET ORAL at 13:43

## 2018-07-07 ASSESSMENT — ACTIVITIES OF DAILY LIVING (ADL)
ADLS_ACUITY_SCORE: 9

## 2018-07-07 NOTE — DISCHARGE INSTRUCTIONS
Please call the OBGYN office Monday morning (7/9) to schedule a follow-up appointment and blood pressure check.  Please call/return sooner if you have: chest pain, palpitations, worsening shortness of breath, severe persistent headache, visual changes, or for any other major concern.  Take your lasix once a day for the next few days.    Avoid NSAIDs (such as Aspirin, Ibuprofen, Advil)

## 2018-07-07 NOTE — PLAN OF CARE
Problem: Patient Care Overview  Goal: Plan of Care/Patient Progress Review  Outcome: No Change  Pt admitted to floor at 2150. Tmax 100.1  and baby with pt. Pt breastfeeding. Up with SBA. C section inc with small drainage covered with tegaderm. Abdominal binder in place. Oriented to room. MD notified. Hospitalist consult ordered.

## 2018-07-07 NOTE — CONSULTS
[facility]  Hospitalist Consult Note  Name: Rachael Peña    MRN: [id number]  YOB: 1985    Age: 32 year old  Date of admission: 7/6/2018  Primary care provider: Balgobin, Christopher Lennox Paul     Requesting Physician:  Viky Santacruz  Reason for consult:  Post-operative medical management         Assessment and Plan:   Rachael Peña is a 32 year old female with a history of C section for breech presentation on 7/2/18 who came to the ER on 7/6/18 with complaints of SOB and bilateral LE edema consistent with volume overload in setting of recent surgery/hospitilization.  ER evaluation notable for mildly elevated BNP/hypertension.  She was given oral furosemide in the ER (refused IV placement).  Unfortunately I/O and daily weights not checked    1.  Volume overload in setting of recent surgery/hospitalization - given single dose furosemide 20 by mouth.  On review of her chart I also see she got a fair amount of ketorolac which could also have contributed to some fluid retention.  She still has e/o fluid retention, would recommend 2-3 days of outpatient daily furosemide, and she'll need close f/u to determine if additional days are needed.  She'll also need BMP/mag this am to assess depletion.    2. Bilateral finger numbness-present since being pregnant, ? Some due to fluid retention vs other.  Since it began during her pregnancy - I've asked that she take this up with Dr Santacruz.    3.  Htn:  I think all due to fluid, it's come down nicely since diuretics given         Code status: Full  Prophylaxis: per primary service  Disposition Plan :  Per primary-but likely home today.  Defer to primary service regarding lactation instructions in setting of furosemide     Thank you for the consultation,         History of Present Illness:   Rachael Peña is a 32 year old female with a history of C section for breech presentation on 7/2/18 who came to the ER on 7/6/18 with complaints of SOB and  "bilateral LE edema consistent with volume overload in setting of recent surgery/hospitilization.     ER evaluation notable for mildly elevated BNP/hypertension.  She was given oral furosemide in the ER (refused IV placement).  Unfortunately I/O and daily weights not checked.    She is already feeling better since admission.  She does however complain of bilateral finger numbness present since the pregnancy, without weakness.     She is wondering when she'll be able to go home    Discussed with patient and RN                  Past Medical History:     Past Medical History:   Diagnosis Date     NO ACTIVE PROBLEMS              Past Surgical History:     Past Surgical History:   Procedure Laterality Date      SECTION N/A 2018    Procedure:  SECTION;   section ;  Surgeon: Leeann Oleary MD;  Location: [facility] L+D     NO HISTORY OF SURGERY                 Social History:     Social History   Substance Use Topics     Smoking status: Never Smoker     Smokeless tobacco: Never Used     Alcohol use No             Family History:   Family History   Family history reviewed with patient and is noncontributory.          Allergies:     Allergies   Allergen Reactions     Amoxicillin      Latex      Pcn [Penicillin G]              Medications:     Prior to Admission medications    Medication Sig Last Dose Taking? Auth Provider   senna-docusate (SENOKOT-S;PERICOLACE) 8.6-50 MG per tablet Take 1 tablet by mouth 2 times daily as needed for constipation not started  Brisa Peña MD       Current hospital administered medication list (MAR) also reviewed.          Review of Systems:   A comprehensive greater than 10 system review of systems was carried out.  Pertinent positives and negatives are noted above.  Otherwise negative for contributory info.            Physical Exam:   Blood pressure 114/46, pulse 75, temperature 99  F (37.2  C), temperature source Oral, resp. rate 20, height 1.549 m (5' 1\"), " weight 82.6 kg (182 lb 1.6 oz), SpO2 96 %, currently breastfeeding.  Exam:  GENERAL: No apparent distress. Awake, alert, and fully oriented.  HEENT: Normocephalic, atraumatic. Extraocular movements intact.  CARDIOVASCULAR: Regular rate and rhythm without murmurs or rubs. No S3. 2 + b le edema  PULMONARY: Clear bilaterally, diminished in the bases  ABDOMINAL: gravid post partum some ttp due to recent c section  EXTREMITIES: No cyanosis or clubbing. No edema.  NEUROLOGICAL: CN 2-12 grossly intact, no focal neurological deficits.  Skin:  W/d no c/c         Data:   Imaging:  Personally reviewed.    EKG/Telemetry:  Personally reviewed.    Labs: Personally reviewed.     Recent Labs  Lab 07/06/18  1751 07/03/18  0652 07/02/18  0110   WBC 10.5  --   --    HGB 10.0* 9.7* 11.5*   HCT 32.7*  --   --    MCV 92  --   --      --   --        Recent Labs  Lab 07/06/18  1751      POTASSIUM 4.6   CHLORIDE 110*   CO2 26   ANIONGAP 5   GLC 87   BUN 12   CR 0.72   GFRESTIMATED >90   GFRESTBLACK >90   KRISTINA 8.7       Recent Labs  Lab 07/06/18  1751   NTBNPI 946*       Recent Labs  Lab 07/06/18  1751   GLC 87         Marilee Lion MD  [facility] Hospitalist

## 2018-07-07 NOTE — ED NOTES
[facility]  ED Nurse Handoff Report    Rachael Peña is a 32 year old female   ED Chief complaint: Postpartum Complications  . ED Diagnosis:   Final diagnoses:   None     Allergies:   Allergies   Allergen Reactions     Amoxicillin      Latex      Pcn [Penicillin G]        Code Status: Full Code  Activity level - Baseline/Home:  Independent. Activity Level - Current:   Independent. Lift room needed: No. Bariatric: No   Needed: No   Isolation: No. Infection: Not Applicable.     Vital Signs:   Vitals:    07/06/18 1845 07/06/18 1900 07/06/18 1915 07/06/18 2004   BP: 125/74 129/81 130/74 134/83   Pulse:    75   Resp:       Temp:       TempSrc:       SpO2: 97%   99%   Height:           Cardiac Rhythm:  ,      Pain level:    Patient confused: No. Patient Falls Risk: Yes.   Elimination Status: Has voided   Patient Report - Initial Complaint: Rachael Peña is a 32 year old female status post C section on July 2 who presents with postpartum complication. The patient states that she did receive fluid while in the hospital, but notes that her bilatearl leg swelling has been improving. Since being discharged from the hospital, she has had a cough as well as nasal discharge. She states that there were no sick contacts and denies any fevers. The patient has been urinating normally and notes that she is breastfeeding. Focused Assessment:   Respiratory Respiratory - Respiratory WDL: all Rhythm/Pattern, Respiratory: shortness of breath reported (no CP, calf pain or swelling. recent delivery ) Lung Fields: All Fields Throughout All Lung Fields: clear; equal bilaterally; diminished; crackles fine      Tests Performed: labs, CXR. Abnormal Results:   Labs Ordered and Resulted from Time of ED Arrival Up to the Time of Departure from the ED   BASIC METABOLIC PANEL - Abnormal; Notable for the following:        Result Value    Chloride 110 (*)     All other components within normal limits   CBC WITH PLATELETS  DIFFERENTIAL - Abnormal; Notable for the following:     RBC Count 3.56 (*)     Hemoglobin 10.0 (*)     Hematocrit 32.7 (*)     MCHC 30.6 (*)     All other components within normal limits   NT PROBNP INPATIENT - Abnormal; Notable for the following:     N-Terminal Pro BNP Inpatient 946 (*)     All other components within normal limits   HEPATIC PANEL - Abnormal; Notable for the following:     Albumin 2.4 (*)     Protein Total 5.9 (*)     ALT 67 (*)     All other components within normal limits     Chest XR,  PA & LAT   Final Result   IMPRESSION: Small bilateral pleural effusions are new since the   previous exam. No other significant interval change. Heart size and   pulmonary vascularity are within normal limits. Lungs clear.      JIE ROCHE MD      .   Treatments provided: Lasix, Monitor: New born child of patient is in the room as well and will need a bili blanket. Spouse will go home and bring the bili blanket when patient is moved upstair.  Family Comments: Spouse and baby in room.   OBS brochure/video discussed/provided to patient:  Yes  ED Medications:   Medications   furosemide (LASIX) tablet 20 mg (20 mg Oral Given 7/6/18 2153)     Drips infusing:  No  For the majority of the shift, the patient's behavior Green. Interventions performed were NA.     Severe Sepsis OR Septic Shock Diagnosis Present: No      ED Nurse Name/Phone Number: Micha Solis,   8:33 PM    RECEIVING UNIT ED HANDOFF REVIEW    Above ED Nurse Handoff Report was reviewed: Yes  Reviewed by: Kathleen Reis on July 6, 2018 at 9:02 PM

## 2018-07-07 NOTE — PLAN OF CARE
"Problem: Fluid Volume Excess (Adult)  Goal: Identify Related Risk Factors and Signs and Symptoms  Related risk factors and signs and symptoms are identified upon initiation of [employer] Clinical Practice Guideline (CPG).  Outcome: No Change  Pt Temp 99.4, denies N/V, reports LL posterior pain when she coughs, reports feeling congested, infrequent productive cough, pt reports sputum whitish \"like foam\", IS provided & encouraged to use. BLE +2 edema, pt reports feeling puffy. Adequate UOP, independent in room.  site transparent dressing with dried blood. Spouse & baby in room, calls appropriately. Disp: Possibly home today on lasix & F/U.       "

## 2018-07-07 NOTE — PROGRESS NOTES
Pt to D/C to home.  Pt provided with d/c instructions, including new medications, when medications were last given, and when to take them again.  Pt also informed to f/u with OB on monday.  Pt verbalized understanding of all d/c and f/u instructions.  All questions were answered at this time.  Copy of paperwork sent with pt.  Medication (Lasix) x 1 sent with pt.   to provide transport.  All personal belongings sent with pt.

## 2018-07-07 NOTE — PHARMACY-ADMISSION MEDICATION HISTORY
Admission medication history interview status for this patient is complete. See [facility] admission navigator for allergy information, prior to admission medications and immunization status.     Medication history interview source(s):Patient and Family  Medication history resources (including written lists, pill bottles, clinic record):None  Primary pharmacy:-    Changes made to PTA medication list:  Added: -  Deleted: -  Changed: -    Actions taken by pharmacist (provider contacted, etc):None     Additional medication history information:None    Medication reconciliation/reorder completed by provider prior to medication history? No    Do you take OTC medications (eg tylenol, ibuprofen, fish oil, eye/ear drops, etc)? -(Y/N)    For patients on insulin therapy: no (Y/N)  Lantus/levemir/NPH/Mix 70/30 dose:   (Y/N) (see Med list for doses)   Sliding scale Novolog Y/N  If Yes, do you have a baseline novolog pre-meal dose:  units with meals  Patients eat three meals a day:   Y/N    How many episodes of hypoglycemia do you have per week: _______  How many missed doses do you have per week: ______  How many times do you check your blood glucose per day: _______   Any Barriers to therapy - Be specific :  cost of medications, comfortable with giving injections (if applicable), comfortable and confident with current diabetes regimen: Y/N ______________      Prior to Admission medications    Medication Sig Last Dose Taking? Auth Provider   senna-docusate (SENOKOT-S;PERICOLACE) 8.6-50 MG per tablet Take 1 tablet by mouth 2 times daily as needed for constipation not started  Brisa Peña MD

## 2018-07-07 NOTE — PROGRESS NOTES
TTE shows normal LVEF but still significantly dilated IVC and pulm HTN    Would continue lasix for 2-3 more days as outatient    Avoid NSAIDS , encourage ambulation    May discharge from my standpoint

## 2018-07-07 NOTE — PROGRESS NOTES
Sitting up in bed feeling improved. Still has cough and mild pain in the back.    VSS afebrile  HEENT  Lungs bilateral rales and decrease BS at both bases  Incision dry and intact'  Abdomen soft and non tender  Extremities +3 edema    Labs pending    A: POD 5 s/p primary c/s for breech      Readmitted for fluid overload causing pulmonary edema      Mildly elevated LFT        P: await lab results        Continue Lasix       Discharge per hospitalist       Needs close follow up in the office

## 2018-07-08 NOTE — DISCHARGE SUMMARY
Patient underwent primary  section on 18 after admission for SROM with known breech fetal presentation. Please see operative report for details. She had an uneventful recovery and was discharged home on POD#3 without complications.     Leeann Oleary MD

## 2018-07-10 NOTE — ANESTHESIA POSTPROCEDURE EVALUATION
Patient: Rachael Peña    Procedure(s):   section  - Wound Class: II-Clean Contaminated    Diagnosis:breeched   Diagnosis Additional Information: 1.  A 32-year-old G1-P0 at 37 and 1 weeks with an intrauterine pregnancy.   2.  Spontaneous rupture of membranes.   3.  Known breech fetal presentation.       POSTOPERATIVE DIAGNOSES:   1.  A 32-year-old G1-P0 at 37 and 1 weeks with an intrauterine p, regnancy.   2.  Normal pelvis.     Anesthesia Type:  Spinal    Note:  Anesthesia Post Evaluation    Patient location during evaluation: PACU  Patient participation: Able to fully participate in evaluation  Level of consciousness: awake  Pain management: adequate  Airway patency: patent  Cardiovascular status: acceptable  Respiratory status: acceptable  Hydration status: acceptable  PONV: controlled     Anesthetic complications: None    Comments: Late entry RO        Last vitals:  Vitals:    18 1806 18 2345 18 0802   BP: 134/73 131/83 122/79   Pulse: 70     Resp: 16 18 18   Temp: 98.4  F (36.9  C) 98.5  F (36.9  C) 97.9  F (36.6  C)   SpO2:            Electronically Signed By: Santiago Agarwal DO  July 10, 2018  8:53 AM

## 2018-07-21 NOTE — DISCHARGE SUMMARY
Rachael is a 32 year old  who was readmitted on POD 4 after a  section for pulmonary edema and pre eclampsia.  She improved with Lasix. Cough lessened. BP and LFT normalized. She was discharged home the following day with Oral Lasix. She will follow up in the clinic in 1 week.

## (undated) DEVICE — SU VICRYL 0 CT-1 36" J346H

## (undated) DEVICE — SOL NACL 0.9% IRRIG 1000ML BOTTLE 2F7124

## (undated) DEVICE — LINEN DRAPE 54X72" 5467

## (undated) DEVICE — PACK C-SECTION LF PL15OTA83B

## (undated) DEVICE — LINEN FULL SHEET 5511

## (undated) DEVICE — PREP CHLORAPREP 26ML TINTED ORANGE  260815

## (undated) DEVICE — PAD CHUX UNDERPAD 30X36" P3036C

## (undated) DEVICE — GLOVE PROTEXIS POWDER FREE 6.5 ORTHOPEDIC 2D73ET65

## (undated) DEVICE — PREP SKIN SCRUB TRAY 4461A

## (undated) DEVICE — LINEN HALF SHEET 5512

## (undated) DEVICE — LINEN BABY BLANKET 5434

## (undated) DEVICE — BAG CLEAR TRASH 1.3M 39X33" P4040C

## (undated) DEVICE — STPL SKIN 35W APPOSE 8886803712

## (undated) DEVICE — GLOVE PROTEXIS BLUE W/NEU-THERA 7.0  2D73EB70

## (undated) DEVICE — STPL SKIN SUBCUTICULAR INSORB  2030

## (undated) DEVICE — DRSG TEGADERM 4X10" 1627

## (undated) DEVICE — SOL WATER IRRIG 1000ML BOTTLE 2F7114

## (undated) DEVICE — CAP BABY PINK/BLUE IC-2

## (undated) DEVICE — LINEN TOWEL PACK X10 5473

## (undated) DEVICE — BLADE CLIPPER SGL USE 9680

## (undated) DEVICE — SUCTION CANISTER MEDIVAC LINER 3000ML W/LID 65651-530

## (undated) DEVICE — STOCKING SLEEVE VASOPRESS COMPRESSION CALF MED 18" VP501M

## (undated) DEVICE — PREP POVIDONE IODINE SOLUTION 10% 4OZ

## (undated) DEVICE — GLOVE PROTEXIS POWDER FREE 7.0 ORTHOPEDIC 2D73ET70

## (undated) DEVICE — TRANSFER DEVICE BLOOD NDL HOLDER 364880

## (undated) DEVICE — ESU GROUND PAD ADULT W/CORD E7507

## (undated) DEVICE — SU VICRYL 3-0 SH 27" J316H

## (undated) DEVICE — CATH TRAY FOLEY SURESTEP 16FR DRAIN BAG STATOCK A899916

## (undated) RX ORDER — ONDANSETRON 2 MG/ML
INJECTION INTRAMUSCULAR; INTRAVENOUS
Status: DISPENSED
Start: 2018-07-02

## (undated) RX ORDER — EPHEDRINE SULFATE 50 MG/ML
INJECTION, SOLUTION INTRAMUSCULAR; INTRAVENOUS; SUBCUTANEOUS
Status: DISPENSED
Start: 2018-07-02

## (undated) RX ORDER — FENTANYL CITRATE 50 UG/ML
INJECTION, SOLUTION INTRAMUSCULAR; INTRAVENOUS
Status: DISPENSED
Start: 2018-07-02

## (undated) RX ORDER — MORPHINE SULFATE 1 MG/ML
INJECTION, SOLUTION EPIDURAL; INTRATHECAL; INTRAVENOUS
Status: DISPENSED
Start: 2018-07-02

## (undated) RX ORDER — OXYTOCIN/0.9 % SODIUM CHLORIDE 30/500 ML
PLASTIC BAG, INJECTION (ML) INTRAVENOUS
Status: DISPENSED
Start: 2018-07-02